# Patient Record
Sex: MALE | Race: WHITE | Employment: FULL TIME | URBAN - METROPOLITAN AREA
[De-identification: names, ages, dates, MRNs, and addresses within clinical notes are randomized per-mention and may not be internally consistent; named-entity substitution may affect disease eponyms.]

---

## 2019-04-18 ENCOUNTER — THERAPY VISIT (OUTPATIENT)
Dept: PHYSICAL THERAPY | Facility: CLINIC | Age: 49
End: 2019-04-18
Payer: COMMERCIAL

## 2019-04-18 DIAGNOSIS — M54.2 NECK PAIN: ICD-10-CM

## 2019-04-18 DIAGNOSIS — Z47.89 AFTERCARE FOLLOWING SURGERY OF THE MUSCULOSKELETAL SYSTEM: ICD-10-CM

## 2019-04-18 PROCEDURE — 97161 PT EVAL LOW COMPLEX 20 MIN: CPT | Mod: GP | Performed by: PHYSICAL THERAPIST

## 2019-04-18 PROCEDURE — 97112 NEUROMUSCULAR REEDUCATION: CPT | Mod: GP | Performed by: PHYSICAL THERAPIST

## 2019-04-18 PROCEDURE — 97110 THERAPEUTIC EXERCISES: CPT | Mod: GP | Performed by: PHYSICAL THERAPIST

## 2019-04-18 NOTE — PROGRESS NOTES
"[facility] Initial Evaluation  Subjective:  The history is provided by the patient. No  was used.   Hermelindo Williamson is a 48 year old male with a cervical spine condition.      This is a new condition  S/P Anterior cervical decompression and fusion on 2/13/2019.  No complications.  Patient has returned to work 4 hours a day with a 10 pound lifting restriction.  Patient reports he is a \"100%\" back to normal.  He feels he does not need therapy, but the surgeon has required therapy.  All symptoms have resolved since surgery.  He perviously was experiencing left upper and lower extremity numbness, tingling, pain, and weakness.    Site of Pain: no pain.      Pain Scale: No pain.  Associated symptoms:  Loss of motion/stiffness.   Symptoms are exacerbated by nothing and relieved by nothing.  Since onset symptoms are rapidly improving (since surgery).        General health as reported by patient is good.  Pertinent medical history includes:  Diabetes, overweight, high blood pressure and smoking.  Medical allergies: no.  Other surgeries include:  Other.  Current medications:  High blood pressure medication and other.  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    Barriers include:  None as reported by the patient.    Red flags:  None as reported by the patient.                        Objective:  Standing Alignment:    Cervical/Thoracic:  Forward head  Shoulder/UE:  Rounded shoulders                                  Cervical/Thoracic Evaluation    AROM:  AROM Cervical:    Flexion:            WNL  Extension:       Min/Mod Loss  Rotation:         Left: Min Loss     Right: Min Loss  Side Bend:      Left: Min Loss     Right:  Min Loss    Strength: Fair/Good deep neck flexor activation  Headaches: none  Cervical Myotomes:  normal                                             "                              General     ROS    Assessment/Plan:    Patient is a 48 year old male with cervical complaints.    Patient has the following significant findings with corresponding treatment plan.                Diagnosis 1:  S/P Cervical decompression   Decreased ROM/flexibility - therapeutic exercise, therapeutic activity and home program  Impaired muscle performance - neuro re-education and home program  Decreased function - therapeutic activities and home program  Impaired posture - neuro re-education, therapeutic activities and home program    Therapy Evaluation Codes:   1) History comprised of:   Personal factors that impact the plan of care:      None.    Comorbidity factors that impact the plan of care are:      Diabetes, High blood pressure and Overweight.     Medications impacting care: High blood pressure.  2) Examination of Body Systems comprised of:   Body structures and functions that impact the plan of care:      Cervical spine.   Activity limitations that impact the plan of care are:      Lifting and Working.  3) Clinical presentation characteristics are:   Stable/Uncomplicated.  4) Decision-Making    Low complexity using standardized patient assessment instrument and/or measureable assessment of functional outcome.  Cumulative Therapy Evaluation is: Low complexity.    Previous and current functional limitations:  (See Goal Flow Sheet for this information)    Short term and Long term goals: (See Goal Flow Sheet for this information)     Communication ability:  Patient appears to be able to clearly communicate and understand verbal and written communication and follow directions correctly.  Treatment Explanation - The following has been discussed with the patient:   RX ordered/plan of care  Anticipated outcomes  Possible risks and side effects  This patient would benefit from PT intervention to resume normal activities.   Rehab potential is good.    Frequency:  1 X week, once  daily  Duration:  for 6 weeks  Discharge Plan:  Achieve all LTG.  Independent in home treatment program.  Reach maximal therapeutic benefit.    Please refer to the daily flowsheet for treatment today, total treatment time and time spent performing 1:1 timed codes.

## 2019-04-18 NOTE — LETTER
"[facility] ATHLETIC [facility]  [address]  [facility] [address]  [phone]    2019    Re: Hermelindo Williamson   :   1970  MRN:  [id number]   REFERRING PHYSICIAN:   Eric Torre    [facility] ATHLETIC [facility]    Date of Initial Evaluation:  19  Visits:  Rxs Used: 1  Reason for Referral:     Neck pain  Aftercare following surgery of the musculoskeletal system    EVALUATION SUMMARY    [facility] Athletic [facility] Initial Evaluation  Subjective:  The history is provided by the patient. No  was used.   Hermelindo Williamson is a 48 year old male with a cervical spine condition.      This is a new condition  S/P Anterior cervical decompression and fusion on 2019.  No complications.  Patient has returned to work 4 hours a day with a 10 pound lifting restriction.  Patient reports he is a \"100%\" back to normal.  He feels he does not need therapy, but the surgeon has required therapy.  All symptoms have resolved since surgery.  He perviously was experiencing left upper and lower extremity numbness, tingling, pain, and weakness.    Site of Pain: no pain.      Pain Scale: No pain.  Associated symptoms:  Loss of motion/stiffness.   Symptoms are exacerbated by nothing and relieved by nothing.  Since onset symptoms are rapidly improving (since surgery).        General health as reported by patient is good.  Pertinent medical history includes:  Diabetes, overweight, high blood pressure and smoking.  Medical allergies: no.  Other surgeries include:  Other.  Current medications:  High blood pressure medication and other.  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  Barriers include:  None as reported by the patient.  Red flags:  None as reported by the patient.  Objective:  Standing Alignment:    Cervical/Thoracic:  Forward " head  Shoulder/UE:  Rounded shoulders  Cervical/Thoracic Evaluation  AROM:  AROM Cervical:  Flexion:            WNL  Extension:       Min/Mod Loss  Rotation:         Left: Min Loss     Right: Min Loss  Side Bend:      Left: Min Loss     Right:  Min Loss  Strength: Fair/Good deep neck flexor activation  Headaches: none  Re: Hermelindo Williamson   :   1970    Cervical Myotomes:  normal  Assessment/Plan:    Patient is a 48 year old male with cervical complaints.    Patient has the following significant findings with corresponding treatment plan.                Diagnosis 1:  S/P Cervical decompression   Decreased ROM/flexibility - therapeutic exercise, therapeutic activity and home program  Impaired muscle performance - neuro re-education and home program  Decreased function - therapeutic activities and home program  Impaired posture - neuro re-education, therapeutic activities and home program  Therapy Evaluation Codes:   1) History comprised of:   Personal factors that impact the plan of care:      None.    Comorbidity factors that impact the plan of care are:      Diabetes, High blood pressure and Overweight.     Medications impacting care: High blood pressure.  2) Examination of Body Systems comprised of:   Body structures and functions that impact the plan of care:      Cervical spine.   Activity limitations that impact the plan of care are:      Lifting and Working.  3) Clinical presentation characteristics are:   Stable/Uncomplicated.  4) Decision-Making    Low complexity using standardized patient assessment instrument and/or measureable assessment of functional outcome.  Cumulative Therapy Evaluation is: Low complexity.  Previous and current functional limitations:  (See Goal Flow Sheet for this information)    Short term and Long term goals: (See Goal Flow Sheet for this information)   Communication ability:  Patient appears to be able to clearly communicate and understand verbal and written communication  and follow directions correctly.  Treatment Explanation - The following has been discussed with the patient:   RX ordered/plan of care  Anticipated outcomes  Possible risks and side effects  This patient would benefit from PT intervention to resume normal activities.   Rehab potential is good.  Frequency:  1 X week, once daily  Duration:  for 6 weeks  Discharge Plan:  Achieve all LTG.  Independent in home treatment program.  Reach maximal therapeutic benefit.    INQUIRIES  Therapist: Donta Campa PT  [facility]  [address] [place] [address]  Phone: [phone]  Fax: [phone]

## 2019-10-31 PROBLEM — Z47.89 AFTERCARE FOLLOWING SURGERY OF THE MUSCULOSKELETAL SYSTEM: Status: RESOLVED | Noted: 2019-04-18 | Resolved: 2019-10-31

## 2019-10-31 PROBLEM — M54.2 NECK PAIN: Status: RESOLVED | Noted: 2019-04-18 | Resolved: 2019-10-31

## 2023-07-31 ENCOUNTER — HOSPITAL ENCOUNTER (OUTPATIENT)
Facility: CLINIC | Age: 53
End: 2023-07-31
Attending: INTERNAL MEDICINE | Admitting: INTERNAL MEDICINE
Payer: COMMERCIAL

## 2023-07-31 ENCOUNTER — OFFICE VISIT (OUTPATIENT)
Dept: FAMILY MEDICINE | Facility: CLINIC | Age: 53
End: 2023-07-31
Payer: COMMERCIAL

## 2023-07-31 ENCOUNTER — TELEPHONE (OUTPATIENT)
Dept: GASTROENTEROLOGY | Facility: CLINIC | Age: 53
End: 2023-07-31

## 2023-07-31 VITALS
WEIGHT: 243 LBS | DIASTOLIC BLOOD PRESSURE: 80 MMHG | HEART RATE: 86 BPM | SYSTOLIC BLOOD PRESSURE: 134 MMHG | OXYGEN SATURATION: 97 % | HEIGHT: 69 IN | BODY MASS INDEX: 35.99 KG/M2 | RESPIRATION RATE: 14 BRPM | TEMPERATURE: 98.6 F

## 2023-07-31 DIAGNOSIS — E66.812 CLASS 2 SEVERE OBESITY DUE TO EXCESS CALORIES WITH SERIOUS COMORBIDITY AND BODY MASS INDEX (BMI) OF 36.0 TO 36.9 IN ADULT (H): ICD-10-CM

## 2023-07-31 DIAGNOSIS — E11.29 TYPE 2 DIABETES MELLITUS WITH MICROALBUMINURIA, WITHOUT LONG-TERM CURRENT USE OF INSULIN (H): Primary | ICD-10-CM

## 2023-07-31 DIAGNOSIS — R80.9 TYPE 2 DIABETES MELLITUS WITH MICROALBUMINURIA, WITHOUT LONG-TERM CURRENT USE OF INSULIN (H): Primary | ICD-10-CM

## 2023-07-31 DIAGNOSIS — E78.49 OTHER HYPERLIPIDEMIA: ICD-10-CM

## 2023-07-31 DIAGNOSIS — E66.01 CLASS 2 SEVERE OBESITY DUE TO EXCESS CALORIES WITH SERIOUS COMORBIDITY AND BODY MASS INDEX (BMI) OF 36.0 TO 36.9 IN ADULT (H): ICD-10-CM

## 2023-07-31 DIAGNOSIS — G57.93 NEUROPATHY OF BOTH FEET: ICD-10-CM

## 2023-07-31 DIAGNOSIS — Z12.11 SCREEN FOR COLON CANCER: ICD-10-CM

## 2023-07-31 PROBLEM — G95.9 CERVICAL MYELOPATHY WITH CERVICAL RADICULOPATHY (H): Status: ACTIVE | Noted: 2023-07-31

## 2023-07-31 PROBLEM — E11.9 TYPE 2 DIABETES MELLITUS (H): Chronic | Status: ACTIVE | Noted: 2018-10-31

## 2023-07-31 PROBLEM — E66.09 OBESITY DUE TO EXCESS CALORIES: Status: RESOLVED | Noted: 2017-06-15 | Resolved: 2023-07-31

## 2023-07-31 PROBLEM — F41.9 ANXIETY AND DEPRESSION: Status: ACTIVE | Noted: 2020-09-09

## 2023-07-31 PROBLEM — Z72.0 TOBACCO ABUSE: Status: ACTIVE | Noted: 2017-06-15

## 2023-07-31 PROBLEM — M54.12 CERVICAL MYELOPATHY WITH CERVICAL RADICULOPATHY (H): Status: ACTIVE | Noted: 2023-07-31

## 2023-07-31 PROBLEM — F19.10 SUBSTANCE ABUSE (H): Status: ACTIVE | Noted: 2018-05-04

## 2023-07-31 PROBLEM — I10 ESSENTIAL HYPERTENSION: Status: ACTIVE | Noted: 2018-10-31

## 2023-07-31 PROBLEM — E66.09 OBESITY DUE TO EXCESS CALORIES: Status: ACTIVE | Noted: 2017-06-15

## 2023-07-31 PROBLEM — F19.10 SUBSTANCE ABUSE (H): Status: RESOLVED | Noted: 2018-05-04 | Resolved: 2023-07-31

## 2023-07-31 PROBLEM — F32.A ANXIETY AND DEPRESSION: Status: ACTIVE | Noted: 2020-09-09

## 2023-07-31 LAB
ALBUMIN SERPL BCG-MCNC: 4.3 G/DL (ref 3.5–5.2)
ALP SERPL-CCNC: 101 U/L (ref 40–129)
ALT SERPL W P-5'-P-CCNC: 37 U/L (ref 0–70)
ANION GAP SERPL CALCULATED.3IONS-SCNC: 10 MMOL/L (ref 7–15)
AST SERPL W P-5'-P-CCNC: 27 U/L (ref 0–45)
BILIRUB SERPL-MCNC: 0.2 MG/DL
BUN SERPL-MCNC: 16.7 MG/DL (ref 6–20)
CALCIUM SERPL-MCNC: 9.7 MG/DL (ref 8.6–10)
CHLORIDE SERPL-SCNC: 100 MMOL/L (ref 98–107)
CHOLEST SERPL-MCNC: 399 MG/DL
CREAT SERPL-MCNC: 0.54 MG/DL (ref 0.67–1.17)
CREAT UR-MCNC: 44.1 MG/DL
DEPRECATED HCO3 PLAS-SCNC: 23 MMOL/L (ref 22–29)
GFR SERPL CREATININE-BSD FRML MDRD: >90 ML/MIN/1.73M2
GLUCOSE SERPL-MCNC: 297 MG/DL (ref 70–99)
HBA1C MFR BLD: 11.7 % (ref 0–5.6)
HDLC SERPL-MCNC: 36 MG/DL
LDLC SERPL CALC-MCNC: ABNORMAL MG/DL
LDLC SERPL DIRECT ASSAY-MCNC: 259 MG/DL
MICROALBUMIN UR-MCNC: 37.5 MG/L
MICROALBUMIN/CREAT UR: 85.03 MG/G CR (ref 0–17)
NONHDLC SERPL-MCNC: 363 MG/DL
POTASSIUM SERPL-SCNC: 4.4 MMOL/L (ref 3.4–5.3)
PROT SERPL-MCNC: 7.3 G/DL (ref 6.4–8.3)
SODIUM SERPL-SCNC: 133 MMOL/L (ref 136–145)
TRIGL SERPL-MCNC: 533 MG/DL

## 2023-07-31 PROCEDURE — 83721 ASSAY OF BLOOD LIPOPROTEIN: CPT | Mod: 59 | Performed by: FAMILY MEDICINE

## 2023-07-31 PROCEDURE — 99203 OFFICE O/P NEW LOW 30 MIN: CPT | Performed by: FAMILY MEDICINE

## 2023-07-31 PROCEDURE — 36415 COLL VENOUS BLD VENIPUNCTURE: CPT | Performed by: FAMILY MEDICINE

## 2023-07-31 PROCEDURE — 99207 PR FOOT EXAM NO CHARGE: CPT | Performed by: FAMILY MEDICINE

## 2023-07-31 PROCEDURE — 82570 ASSAY OF URINE CREATININE: CPT | Performed by: FAMILY MEDICINE

## 2023-07-31 PROCEDURE — 80061 LIPID PANEL: CPT | Performed by: FAMILY MEDICINE

## 2023-07-31 PROCEDURE — 80053 COMPREHEN METABOLIC PANEL: CPT | Performed by: FAMILY MEDICINE

## 2023-07-31 PROCEDURE — 82043 UR ALBUMIN QUANTITATIVE: CPT | Performed by: FAMILY MEDICINE

## 2023-07-31 PROCEDURE — 83036 HEMOGLOBIN GLYCOSYLATED A1C: CPT | Performed by: FAMILY MEDICINE

## 2023-07-31 RX ORDER — METFORMIN HCL 500 MG
1000 TABLET, EXTENDED RELEASE 24 HR ORAL 2 TIMES DAILY WITH MEALS
Qty: 360 TABLET | Refills: 1 | Status: SHIPPED | OUTPATIENT
Start: 2023-07-31 | End: 2024-03-20

## 2023-07-31 RX ORDER — ATORVASTATIN CALCIUM 20 MG/1
20 TABLET, FILM COATED ORAL DAILY
Qty: 90 TABLET | Refills: 3 | Status: SHIPPED | OUTPATIENT
Start: 2023-07-31

## 2023-07-31 RX ORDER — METFORMIN HCL 500 MG
2000 TABLET, EXTENDED RELEASE 24 HR ORAL
COMMUNITY
Start: 2022-03-03 | End: 2023-08-03

## 2023-07-31 RX ORDER — GABAPENTIN 300 MG/1
300 CAPSULE ORAL 3 TIMES DAILY
Qty: 270 CAPSULE | Refills: 0 | Status: SHIPPED | OUTPATIENT
Start: 2023-07-31 | End: 2023-08-22

## 2023-07-31 RX ORDER — LANCETS
EACH MISCELLANEOUS
Qty: 100 EACH | Refills: 6 | Status: SHIPPED | OUTPATIENT
Start: 2023-07-31

## 2023-07-31 ASSESSMENT — ANXIETY QUESTIONNAIRES
4. TROUBLE RELAXING: SEVERAL DAYS
3. WORRYING TOO MUCH ABOUT DIFFERENT THINGS: SEVERAL DAYS
1. FEELING NERVOUS, ANXIOUS, OR ON EDGE: MORE THAN HALF THE DAYS
7. FEELING AFRAID AS IF SOMETHING AWFUL MIGHT HAPPEN: SEVERAL DAYS
6. BECOMING EASILY ANNOYED OR IRRITABLE: MORE THAN HALF THE DAYS
GAD7 TOTAL SCORE: 9
GAD7 TOTAL SCORE: 9
IF YOU CHECKED OFF ANY PROBLEMS ON THIS QUESTIONNAIRE, HOW DIFFICULT HAVE THESE PROBLEMS MADE IT FOR YOU TO DO YOUR WORK, TAKE CARE OF THINGS AT HOME, OR GET ALONG WITH OTHER PEOPLE: VERY DIFFICULT
5. BEING SO RESTLESS THAT IT IS HARD TO SIT STILL: SEVERAL DAYS
2. NOT BEING ABLE TO STOP OR CONTROL WORRYING: SEVERAL DAYS

## 2023-07-31 ASSESSMENT — PATIENT HEALTH QUESTIONNAIRE - PHQ9
SUM OF ALL RESPONSES TO PHQ QUESTIONS 1-9: 12
10. IF YOU CHECKED OFF ANY PROBLEMS, HOW DIFFICULT HAVE THESE PROBLEMS MADE IT FOR YOU TO DO YOUR WORK, TAKE CARE OF THINGS AT HOME, OR GET ALONG WITH OTHER PEOPLE: VERY DIFFICULT
SUM OF ALL RESPONSES TO PHQ QUESTIONS 1-9: 12

## 2023-07-31 ASSESSMENT — PAIN SCALES - GENERAL: PAINLEVEL: NO PAIN (0)

## 2023-07-31 NOTE — PROGRESS NOTES
Assessment & Plan     Type 2 diabetes mellitus with microalbuminuria, without long-term current use of insulin (H)  Rstart metformin, astorvastatin. Recheck labs.  - Comprehensive metabolic panel (BMP + Alb, Alk Phos, ALT, AST, Total. Bili, TP); Future  - Lipid panel reflex to direct LDL Non-fasting; Future  - Hemoglobin A1c; Future  - Albumin Random Urine Quantitative with Creat Ratio; Future  - atorvastatin (LIPITOR) 20 MG tablet; Take 1 tablet (20 mg) by mouth daily (Patient not taking: Reported on 9/6/2023)  - metFORMIN (GLUCOPHAGE XR) 500 MG 24 hr tablet; Take 2 tablets (1,000 mg) by mouth 2 times daily (with meals) (Patient not taking: Reported on 9/6/2023)  - blood glucose monitoring (NO BRAND SPECIFIED) meter device kit; Use to test blood sugar 3 times daily or as directed. Preferred blood glucose meter OR supplies to accompany: Blood Glucose Monitor Brands: per insurance.  - blood glucose (NO BRAND SPECIFIED) test strip; Use to test blood sugar 3 times daily or as directed. To accompany: Blood Glucose Monitor Brands: per insurance.  - thin (NO BRAND SPECIFIED) lancets; Use with lanceting device. To accompany: Blood Glucose Monitor Brands: per insurance.  - AMB Adult Diabetes Educator Referral; Future  - FOOT EXAM  - Comprehensive metabolic panel (BMP + Alb, Alk Phos, ALT, AST, Total. Bili, TP)  - Lipid panel reflex to direct LDL Non-fasting  - Hemoglobin A1c  - Albumin Random Urine Quantitative with Creat Ratio  - LDL cholesterol direct    Neuropathy of both feet  Gabapentin, work on getting blood sugars under control.    Screen for colon cancer  - Colonoscopy Screening  Referral; Future    Class 2 severe obesity due to excess calories with serious comorbidity and body mass index (BMI) of 36.0 to 36.9 in adult (H)  Emphasized importance of balanced low-carb diet and regular exercise.      Other hyperlipidemia  Recheck lipids, continue atorvastatin.               BMI:   Estimated body mass index  "is 36.41 kg/m  as calculated from the following:    Height as of this encounter: 1.74 m (5' 8.5\").    Weight as of this encounter: 110.2 kg (243 lb).     Depression Screening Follow Up        7/31/2023     9:20 AM   PHQ   PHQ-9 Total Score 12   Q9: Thoughts of better off dead/self-harm past 2 weeks Several days   F/U: Thoughts of suicide or self-harm No   F/U: Safety concerns No       DO FAREED [facility] PRIOR DEVORA Casarez is a 53 year old, presenting for the following health issues:    Diabetes        7/31/2023     9:30 AM   Additional Questions   Roomed by SENA MILLAN   Accompanied by SELF       History of Present Illness       Mental Health Follow-up:  Patient presents to follow-up on Depression & Anxiety.Patient's depression since last visit has been:  No change  The patient is not having other symptoms associated with depression.  Patient's anxiety since last visit has been:  Bad  The patient is having other symptoms associated with anxiety.  Any significant life events: financial concerns, housing concerns, grief or loss and health concerns  Patient is feeling anxious or having panic attacks.  Patient has no concerns about alcohol or drug use.    Diabetes:   He presents for follow up of diabetes.  He is checking home blood glucose a few times a month.   He checks blood glucose at bedtime.  Blood glucose is sometimes over 200 and never under 70. He is aware of hypoglycemia symptoms including none.   He is concerned about blood sugar frequently over 200.   He is having numbness in feet, burning in feet, redness, sores, or blisters on feet, excessive thirst and blurry vision.  The patient has had a diabetic eye exam in the last 12 months. Eye exam performed on 7/2022. Location of last eye exam [place] [facility].        Reason for visit:  Get diebetes back on track and leg/foot pain    He eats 0-1 servings of fruits and vegetables daily.He consumes 3 sweetened beverage(s) daily.He " "exercises with enough effort to increase his heart rate 9 or less minutes per day.  He exercises with enough effort to increase his heart rate 4 days per week. He is missing 7 dose(s) of medications per week.       Over the past couple years, getting worse lower extremity pain. Pain comes in waves. Pain is mostly in feet and some up into the legs just above the knee. Also feels numbness in his feet. Feels like his toes are \"plastic\" and numb. Hasn't been checking blood sugars at home but last time he remembers, wasn't ever below 200.       Anxiety/Depression      7/31/2023     9:20 AM   PHQ   PHQ-9 Total Score 12   Q9: Thoughts of better off dead/self-harm past 2 weeks Several days   F/U: Thoughts of suicide or self-harm No   F/U: Safety concerns No         7/31/2023     9:35 AM   NORMA-7 SCORE   Total Score 9 (mild anxiety)   Total Score 9     Foot neuropathy is so bad -- thoughts come into his head to end his life (no plan or intent) but then the thoughts go away. Takes care of his wife (recent open heart surgery) and elderly father.        Review of Systems   Constitutional, HEENT, cardiovascular, pulmonary, gi and gu systems are negative, except as otherwise noted.      Objective    /80   Pulse 86   Temp 98.6  F (37  C) (Tympanic)   Resp 14   Ht 1.74 m (5' 8.5\")   Wt 110.2 kg (243 lb)   SpO2 97%   BMI 36.41 kg/m    Body mass index is 36.41 kg/m .  Physical Exam     GENERAL: healthy, alert and no distress  RESP: lungs clear to auscultation - no rales, rhonchi or wheezes  CV: regular rate and rhythm, normal S1 S2, no S3 or S4, no murmur, click or rub, no peripheral edema and peripheral pulses strong  MS: no gross musculoskeletal defects noted, no edema  Diabetic foot exam: normal DP and PT pulses, no trophic changes or ulcerative lesions, normal sensory exam, and normal monofilament exam, except for findings noted on diabetic foot graphical image.  Numbness at locations 1-3 bilaterally and minimal " sensation at spots 4-6 bilaterally.

## 2023-07-31 NOTE — TELEPHONE ENCOUNTER
"Endoscopy Scheduling Screen    Have you had a positive Covid test in the last 14 days?  No    Are you active on MyChart?   No    What insurance is in the chart?  Other:  [facility]    Ordering/Referring Provider:     JONEL BARBA      (If ordering provider performs procedure, schedule with ordering provider unless otherwise instructed. )    BMI: Estimated body mass index is 36.41 kg/m  as calculated from the following:    Height as of an earlier encounter on 7/31/23: 1.74 m (5' 8.5\").    Weight as of an earlier encounter on 7/31/23: 110.2 kg (243 lb).     Sedation Ordered  moderate sedation.   If patient BMI > 50 do not schedule in ASC.    Are you taking any prescription medications for pain?   No    Are you taking methadone or Suboxone?  No    Do you have a history of malignant hyperthermia or adverse reaction to anesthesia?  No GABAPINTON    (Females) Are you currently pregnant?   No     Have you been diagnosed or told you have pulmonary hypertension?   No    Do you have an LVAD?  No    Have you been told you have moderate to severe sleep apnea?  No    Have you been told you have COPD, asthma, or any other lung disease?  No    Do you have any heart conditions?  No     Have you ever had or are you awaiting a heart or lung transplant?   No    Have you had a stroke or transient ischemic attack (TIA aka \"mini stroke\" in the last 6 months?   No    Have you been diagnosed with or been told you have cirrhosis of the liver?   No    Are you currently on dialysis?   No    Do you need assistance transferring?   No    BMI: Estimated body mass index is 36.41 kg/m  as calculated from the following:    Height as of an earlier encounter on 7/31/23: 1.74 m (5' 8.5\").    Weight as of an earlier encounter on 7/31/23: 110.2 kg (243 lb).     Is patients BMI > 40 and scheduling location [facility]?  No    Do you take the medication Phentermine, Ozempic or Wegovy?  No    Do you take the medication Naltrexone?  No    Do you take blood " thinners?  Yes     Are you taking Effient/Prasugrel?  No, you must contact your prescribing provider for direction on holding or bridging with a different medication.      Prep   Are you currently on dialysis or do you have chronic kidney disease?  No    Do you have a diagnosis of diabetes?  Yes (Golytely Prep)    Do you have a diagnosis of cystic fibrosis (CF)?  No    On a regular basis do you go 3 -5 days between bowel movements?  No    BMI > 40?  No    Preferred Pharmacy:  No Pharmacies Listed    Final Scheduling Details   Colonoscopy prep sent?  Golytely Extended Prep    Procedure scheduled  Colonoscopy    Surgeon:  CHEN      Date of procedure:  10/18/23     Schedule PAC:   No    Location  [facility]    Sedation   Moderate Sedation    Patient Reminders:   You will receive a call from a Nurse to review instructions and health history.  This assessment must be completed prior to your procedure.  Failure to complete the Nurse assessment may result in the procedure being cancelled.      On the day of your procedure, please designate an adult(s) who can drive you home stay with you for the next 24 hours. The medicines used in the exam will make you sleepy. You will not be able to drive.      You cannot take public transportation, ride share services, or non-medical taxi service without a responsible caregiver.  Medical transport services are allowed with the requirement that a responsible caregiver will receive you at your destination.  We require that drivers and caregivers are confirmed prior to your procedure.

## 2023-07-31 NOTE — PATIENT INSTRUCTIONS
Gabapentin    300 mg by mouth once daily at bedtime for days 1-5  300 mg by mouth twice daily in AM and at bedtime for days 6-10  300 mg by mouth three times daily (if tolerating without any sedation or other side effects)

## 2023-08-01 ENCOUNTER — TELEPHONE (OUTPATIENT)
Dept: FAMILY MEDICINE | Facility: CLINIC | Age: 53
End: 2023-08-01

## 2023-08-01 NOTE — TELEPHONE ENCOUNTER
"Diabetes Education Scheduling Outreach #1:    Call to patient to schedule. Patient declined to schedule. Pt stated this is \"just a start up.\" Stated he knows what he needs to do.    Crissy Hinton  [facility]  Diabetes and Nutrition Scheduling    "

## 2023-08-03 DIAGNOSIS — E11.29 TYPE 2 DIABETES MELLITUS WITH MICROALBUMINURIA, WITHOUT LONG-TERM CURRENT USE OF INSULIN (H): Primary | ICD-10-CM

## 2023-08-03 DIAGNOSIS — R80.9 TYPE 2 DIABETES MELLITUS WITH MICROALBUMINURIA, WITHOUT LONG-TERM CURRENT USE OF INSULIN (H): Primary | ICD-10-CM

## 2023-08-03 RX ORDER — LISINOPRIL 2.5 MG/1
2.5 TABLET ORAL DAILY
Qty: 90 TABLET | Refills: 3 | Status: SHIPPED | OUTPATIENT
Start: 2023-08-03

## 2023-08-21 ENCOUNTER — ALLIED HEALTH/NURSE VISIT (OUTPATIENT)
Dept: NURSING | Facility: CLINIC | Age: 53
End: 2023-08-21
Payer: COMMERCIAL

## 2023-08-21 ENCOUNTER — LAB (OUTPATIENT)
Dept: LAB | Facility: CLINIC | Age: 53
End: 2023-08-21
Payer: COMMERCIAL

## 2023-08-21 VITALS
SYSTOLIC BLOOD PRESSURE: 138 MMHG | OXYGEN SATURATION: 95 % | HEART RATE: 105 BPM | RESPIRATION RATE: 18 BRPM | DIASTOLIC BLOOD PRESSURE: 74 MMHG

## 2023-08-21 DIAGNOSIS — E11.29 TYPE 2 DIABETES MELLITUS WITH MICROALBUMINURIA, WITHOUT LONG-TERM CURRENT USE OF INSULIN (H): ICD-10-CM

## 2023-08-21 DIAGNOSIS — I10 ESSENTIAL HYPERTENSION: Primary | ICD-10-CM

## 2023-08-21 DIAGNOSIS — R80.9 TYPE 2 DIABETES MELLITUS WITH MICROALBUMINURIA, WITHOUT LONG-TERM CURRENT USE OF INSULIN (H): ICD-10-CM

## 2023-08-21 DIAGNOSIS — G57.93 NEUROPATHY OF BOTH FEET: ICD-10-CM

## 2023-08-21 LAB
ANION GAP SERPL CALCULATED.3IONS-SCNC: 13 MMOL/L (ref 7–15)
BUN SERPL-MCNC: 14.3 MG/DL (ref 6–20)
CALCIUM SERPL-MCNC: 9.6 MG/DL (ref 8.6–10)
CHLORIDE SERPL-SCNC: 98 MMOL/L (ref 98–107)
CREAT SERPL-MCNC: 0.54 MG/DL (ref 0.67–1.17)
DEPRECATED HCO3 PLAS-SCNC: 23 MMOL/L (ref 22–29)
GFR SERPL CREATININE-BSD FRML MDRD: >90 ML/MIN/1.73M2
GLUCOSE SERPL-MCNC: 302 MG/DL (ref 70–99)
POTASSIUM SERPL-SCNC: 4.4 MMOL/L (ref 3.4–5.3)
SODIUM SERPL-SCNC: 134 MMOL/L (ref 136–145)

## 2023-08-21 PROCEDURE — 99207 PR NO CHARGE NURSE ONLY: CPT

## 2023-08-21 PROCEDURE — 80048 BASIC METABOLIC PNL TOTAL CA: CPT

## 2023-08-21 PROCEDURE — 36415 COLL VENOUS BLD VENIPUNCTURE: CPT

## 2023-08-21 NOTE — PROGRESS NOTES
"  Hermelindo Williamson is being followed for Blood Pressure management.    The patient is requesting a medication refill.     He states the medication helped along with gabapentin for his neuropathy pain. He describes the pain as burning pain in his knees, calves and feet. He also experiences \"random shots of pain all over\". He also gets lower leg cramps in both legs at night.    He had previously been taking cyclobenzaprine 1-3 times a day    He states the gabapentin is working to help him sleep better at night than without.       Future Appointments 8/21/2023 - 2/17/2024        Date Visit Type Length Department Provider     8/21/2023  9:00 AM NURSE ONLY 30 min RV NURSE RV RN VISITS              9/6/2023  8:00 AM OFFICE VISIT 30 min RV FAMILY PRACTICE Freddy Johnson DO    Location Instructions:     [facility] is located at [address] , [address] Free parking is available; access the lot [address], then west onto [address].              11/6/2023  8:15 AM LAB 15 min RV LABORATORY RV LAB    Location Instructions:     The clinic is located at [address] [place] [address].  in [place].&nbsp; We offer free parking in our on-site lot.                   BP Readings from Last 3 Encounters:   08/21/23 138/74   07/31/23 134/80   12/13/12 138/78       Wt Readings from Last 3 Encounters:   07/31/23 110.2 kg (243 lb)   12/13/12 127.5 kg (281 lb)       Is pulse 55 or greater? - Yes    Pulse Readings from Last 3 Encounters:   08/21/23 105   07/31/23 86   12/13/12 88       Current blood pressure medication(s): patient was taking 4000 mg of metformin in the evening- advised to take  2000 mg as directed with breakfast and dinner.   Fasting blood sugar was 283 this morning per patient - advised to call triage of persistent high blood sugars 200/300, symptoms. Advised to keep a food log and identify food/food items that elevated blood sugar. " A    Advised to check blood sugar as directed and when experiencing symptoms.     Current Outpatient Medications   Medication Sig Dispense Refill    atorvastatin (LIPITOR) 20 MG tablet Take 1 tablet (20 mg) by mouth daily 90 tablet 3    blood glucose (NO BRAND SPECIFIED) test strip Use to test blood sugar 3 times daily or as directed. To accompany: Blood Glucose Monitor Brands: per insurance. 200 strip 6    blood glucose monitoring (NO BRAND SPECIFIED) meter device kit Use to test blood sugar 3 times daily or as directed. Preferred blood glucose meter OR supplies to accompany: Blood Glucose Monitor Brands: per insurance. 1 kit 0    gabapentin (NEURONTIN) 300 MG capsule Take 1 capsule (300 mg) by mouth 3 times daily for 90 days 270 capsule 0    lisinopril (ZESTRIL) 2.5 MG tablet Take 1 tablet (2.5 mg) by mouth daily 90 tablet 3    metFORMIN (GLUCOPHAGE XR) 500 MG 24 hr tablet Take 2 tablets (1,000 mg) by mouth 2 times daily (with meals) 360 tablet 1    thin (NO BRAND SPECIFIED) lancets Use with lanceting device. To accompany: Blood Glucose Monitor Brands: per insurance. 100 each 6          1. Follow up instructions include:     Per provider.    SUBJECTIVE:                                                    The patient is taking medication as prescribed and is tolerating well.   Patient is not monitoring Blood Pressure at home.   Last 3 home readings   N/a - encouraged to take BP at home and call if > 130/80 and or symptoms listed below.       Out of the following complicating factors: Cough, Headache, Lightheadedness, Shortness of breath, Fatigue, Nausea, Sexual Dysfunction, New onset of swelling or edema, Weakness and New onset of Chest Pain, the patient reports:  None    OBJECTIVE:                                                      Today's BP completed using cuff size: large on right side arm.      Potassium   Date Value Ref Range Status   07/31/2023 4.4 3.4 - 5.3 mmol/L Final   12/04/2007 4.0 3.4 - 5.3 mmol/L  Final     Creatinine   Date Value Ref Range Status   07/31/2023 0.54 (L) 0.67 - 1.17 mg/dL Final   08/23/2007 1.18 0.80 - 1.50 mg/dL Final     Urea Nitrogen   Date Value Ref Range Status   07/31/2023 16.7 6.0 - 20.0 mg/dL Final   08/23/2007 11 5 - 24 mg/dL Final     GFR Estimate   Date Value Ref Range Status   07/31/2023 >90 >60 mL/min/1.73m2 Final   08/23/2007 74 >60 mL/min/1.7m2 Final         Education:  general discussion/verbal explanation  Ways to help improve BP/HTN:   Medications  Call if your blood pressure is consistently > 130/80 and or experiencing symptoms listed above.  Lose weight  Diet low in fat and rich in fruits, vegetables and low fat dairy products  Reduce salt in diet  Do something active for at least 30 minutes a day on most days of the week  Cut down on alcohol (if you drink more than 2 drinks per day)  Decrease stress (exercise, read, yoga, meditation, time for self, etc.)   Patient was given an opportunity to ask questions.    Patient verbalized understanding of this plan and is agreeable.    Karey Brennan RN

## 2023-08-22 RX ORDER — GABAPENTIN 300 MG/1
300 CAPSULE ORAL 3 TIMES DAILY
Qty: 270 CAPSULE | Refills: 1 | Status: SHIPPED | OUTPATIENT
Start: 2023-08-22 | End: 2023-09-06

## 2023-09-06 ENCOUNTER — OFFICE VISIT (OUTPATIENT)
Dept: FAMILY MEDICINE | Facility: CLINIC | Age: 53
End: 2023-09-06
Payer: COMMERCIAL

## 2023-09-06 VITALS
HEIGHT: 69 IN | RESPIRATION RATE: 16 BRPM | HEART RATE: 107 BPM | SYSTOLIC BLOOD PRESSURE: 134 MMHG | TEMPERATURE: 97.9 F | BODY MASS INDEX: 36.73 KG/M2 | WEIGHT: 248 LBS | OXYGEN SATURATION: 96 % | DIASTOLIC BLOOD PRESSURE: 76 MMHG

## 2023-09-06 DIAGNOSIS — R80.9 TYPE 2 DIABETES MELLITUS WITH MICROALBUMINURIA, WITHOUT LONG-TERM CURRENT USE OF INSULIN (H): Primary | ICD-10-CM

## 2023-09-06 DIAGNOSIS — F41.9 ANXIETY AND DEPRESSION: ICD-10-CM

## 2023-09-06 DIAGNOSIS — E11.29 TYPE 2 DIABETES MELLITUS WITH MICROALBUMINURIA, WITHOUT LONG-TERM CURRENT USE OF INSULIN (H): Primary | ICD-10-CM

## 2023-09-06 DIAGNOSIS — G62.9 NEUROPATHY: ICD-10-CM

## 2023-09-06 DIAGNOSIS — G57.93 NEUROPATHY OF BOTH FEET: ICD-10-CM

## 2023-09-06 DIAGNOSIS — R52 WHOLE BODY PAIN: ICD-10-CM

## 2023-09-06 DIAGNOSIS — F32.A ANXIETY AND DEPRESSION: ICD-10-CM

## 2023-09-06 LAB
ALBUMIN SERPL BCG-MCNC: 4.4 G/DL (ref 3.5–5.2)
ALP SERPL-CCNC: 96 U/L (ref 40–129)
ALT SERPL W P-5'-P-CCNC: 41 U/L (ref 0–70)
ANION GAP SERPL CALCULATED.3IONS-SCNC: 12 MMOL/L (ref 7–15)
AST SERPL W P-5'-P-CCNC: 27 U/L (ref 0–45)
BILIRUB SERPL-MCNC: 0.4 MG/DL
BUN SERPL-MCNC: 14 MG/DL (ref 6–20)
CALCIUM SERPL-MCNC: 9.7 MG/DL (ref 8.6–10)
CHLORIDE SERPL-SCNC: 101 MMOL/L (ref 98–107)
CK SERPL-CCNC: 66 U/L (ref 39–308)
CREAT SERPL-MCNC: 0.52 MG/DL (ref 0.67–1.17)
CRP SERPL-MCNC: <3 MG/L
DEPRECATED HCO3 PLAS-SCNC: 23 MMOL/L (ref 22–29)
EGFRCR SERPLBLD CKD-EPI 2021: >90 ML/MIN/1.73M2
ERYTHROCYTE [DISTWIDTH] IN BLOOD BY AUTOMATED COUNT: 11.9 % (ref 10–15)
ERYTHROCYTE [SEDIMENTATION RATE] IN BLOOD BY WESTERGREN METHOD: 16 MM/HR (ref 0–20)
GLUCOSE SERPL-MCNC: 289 MG/DL (ref 70–99)
HCT VFR BLD AUTO: 44.9 % (ref 40–53)
HGB BLD-MCNC: 15.9 G/DL (ref 13.3–17.7)
MCH RBC QN AUTO: 30.1 PG (ref 26.5–33)
MCHC RBC AUTO-ENTMCNC: 35.4 G/DL (ref 31.5–36.5)
MCV RBC AUTO: 85 FL (ref 78–100)
PLATELET # BLD AUTO: 271 10E3/UL (ref 150–450)
POTASSIUM SERPL-SCNC: 4.4 MMOL/L (ref 3.4–5.3)
PROT SERPL-MCNC: 7.8 G/DL (ref 6.4–8.3)
RBC # BLD AUTO: 5.29 10E6/UL (ref 4.4–5.9)
SODIUM SERPL-SCNC: 136 MMOL/L (ref 136–145)
WBC # BLD AUTO: 5.9 10E3/UL (ref 4–11)

## 2023-09-06 PROCEDURE — 85027 COMPLETE CBC AUTOMATED: CPT | Performed by: FAMILY MEDICINE

## 2023-09-06 PROCEDURE — 86140 C-REACTIVE PROTEIN: CPT | Performed by: FAMILY MEDICINE

## 2023-09-06 PROCEDURE — 96127 BRIEF EMOTIONAL/BEHAV ASSMT: CPT | Performed by: FAMILY MEDICINE

## 2023-09-06 PROCEDURE — 86038 ANTINUCLEAR ANTIBODIES: CPT | Performed by: FAMILY MEDICINE

## 2023-09-06 PROCEDURE — 82550 ASSAY OF CK (CPK): CPT | Performed by: FAMILY MEDICINE

## 2023-09-06 PROCEDURE — 36415 COLL VENOUS BLD VENIPUNCTURE: CPT | Performed by: FAMILY MEDICINE

## 2023-09-06 PROCEDURE — 80053 COMPREHEN METABOLIC PANEL: CPT | Performed by: FAMILY MEDICINE

## 2023-09-06 PROCEDURE — 85652 RBC SED RATE AUTOMATED: CPT | Performed by: FAMILY MEDICINE

## 2023-09-06 PROCEDURE — 99215 OFFICE O/P EST HI 40 MIN: CPT | Performed by: FAMILY MEDICINE

## 2023-09-06 RX ORDER — GABAPENTIN 300 MG/1
600 CAPSULE ORAL 3 TIMES DAILY
Qty: 270 CAPSULE | Refills: 1 | Status: SHIPPED | OUTPATIENT
Start: 2023-09-06

## 2023-09-06 RX ORDER — SERTRALINE HYDROCHLORIDE 25 MG/1
TABLET, FILM COATED ORAL
Qty: 166 TABLET | Refills: 0 | Status: SHIPPED | OUTPATIENT
Start: 2023-09-06 | End: 2023-12-27

## 2023-09-06 RX ORDER — CYCLOBENZAPRINE HCL 10 MG
10 TABLET ORAL 3 TIMES DAILY PRN
Qty: 90 TABLET | Refills: 0 | Status: SHIPPED | OUTPATIENT
Start: 2023-09-06 | End: 2023-12-27

## 2023-09-06 ASSESSMENT — ANXIETY QUESTIONNAIRES
1. FEELING NERVOUS, ANXIOUS, OR ON EDGE: NEARLY EVERY DAY
GAD7 TOTAL SCORE: 21
5. BEING SO RESTLESS THAT IT IS HARD TO SIT STILL: NEARLY EVERY DAY
IF YOU CHECKED OFF ANY PROBLEMS ON THIS QUESTIONNAIRE, HOW DIFFICULT HAVE THESE PROBLEMS MADE IT FOR YOU TO DO YOUR WORK, TAKE CARE OF THINGS AT HOME, OR GET ALONG WITH OTHER PEOPLE: EXTREMELY DIFFICULT
GAD7 TOTAL SCORE: 21
7. FEELING AFRAID AS IF SOMETHING AWFUL MIGHT HAPPEN: NEARLY EVERY DAY
3. WORRYING TOO MUCH ABOUT DIFFERENT THINGS: NEARLY EVERY DAY
2. NOT BEING ABLE TO STOP OR CONTROL WORRYING: NEARLY EVERY DAY
4. TROUBLE RELAXING: NEARLY EVERY DAY
6. BECOMING EASILY ANNOYED OR IRRITABLE: NEARLY EVERY DAY

## 2023-09-06 NOTE — PROGRESS NOTES
Assessment & Plan     Type 2 diabetes mellitus with microalbuminuria, without long-term current use of insulin (H)  Uncontrolled. Discussed that hyperglycemia can be contributing to the pain and neuropathy he is having. Will try treating mood and get him CGM so that he has more motivation to take care of blood sugars and can check more easily. Follow up in 1 month. Consider restarting oral or injectable medication. May need to just start insulin.  - Continuous Blood Gluc  (FREESTYLE JENNIFER 2 READER) DONALDO; Use to read blood sugars as per 's instructions.  - Continuous Blood Gluc Sensor (FREESTYLE JENNIFER 2 SENSOR) MISC; Change every 14 days.  - gabapentin (NEURONTIN) 300 MG capsule; Take 2 capsules (600 mg) by mouth 3 times daily    Neuropathy  Likely related to diabetes. Will refer to neurology. Try increasing gabapentin to 600 mg TID.  - Adult Neurology  Referral; Future    Whole body pain  Check baseline labs to look for inflammatory or autoimmune source of pain. I suspect poor mood/stress is adversely affecting pain as is uncontrolled diabetes.   - Comprehensive metabolic panel (BMP + Alb, Alk Phos, ALT, AST, Total. Bili, TP); Future  - CRP, inflammation; Future  - ESR: Erythrocyte sedimentation rate; Future  - Anti Nuclear Alejandra IgG by IFA with Reflex; Future  - CK total; Future  - CBC with platelets; Future  - cyclobenzaprine (FLEXERIL) 10 MG tablet; Take 1 tablet (10 mg) by mouth 3 times daily as needed for muscle spasms  - Comprehensive metabolic panel (BMP + Alb, Alk Phos, ALT, AST, Total. Bili, TP)  - CRP, inflammation  - ESR: Erythrocyte sedimentation rate  - Anti Nuclear Alejandra IgG by IFA with Reflex  - CK total  - CBC with platelets    Anxiety and depression  Previously was on sertraline and found benefit. Restart today.  - sertraline (ZOLOFT) 25 MG tablet; Take 1 tablet (25 mg) by mouth daily for 14 days, THEN 2 tablets (50 mg) daily for 76 days.    Neuropathy of both feet  -  "gabapentin (NEURONTIN) 300 MG capsule; Take 2 capsules (600 mg) by mouth 3 times daily             BMI:   Estimated body mass index is 37.16 kg/m  as calculated from the following:    Height as of this encounter: 1.74 m (5' 8.5\").    Weight as of this encounter: 112.5 kg (248 lb).     40 minutes spent by me on the date of the encounter doing chart review, patient visit, and documentation        Freddy Johnson DO  [facility] DEVORA Casarez is a 53 year old, presenting for the following health issues:  Recheck Medication        9/6/2023     7:54 AM   Additional Questions   Roomed by Era MELGAR CMA         History of Present Illness       Mental Health Follow-up:  Patient presents to follow-up on Depression & Anxiety.Patient's depression since last visit has been:  Worse  The patient is having other symptoms associated with depression.  Patient's anxiety since last visit has been:  Worse  The patient is having other symptoms associated with anxiety.  Any significant life events: relationship concerns, job concerns, financial concerns, housing concerns, grief or loss and health concerns  Patient is feeling anxious or having panic attacks.  Patient has no concerns about alcohol or drug use.    Diabetes:   He presents for follow up of diabetes.  He is checking home blood glucose a few times a week.   He checks blood glucose before meals.  Blood glucose is sometimes over 200 and never under 70. He is aware of hypoglycemia symptoms including none.   He is concerned about blood sugar frequently over 200.   He is having numbness in feet, burning in feet, redness, sores, or blisters on feet, excessive thirst and blurry vision.  The patient has not had a diabetic eye exam in the last 12 months.          Vascular Disease:  He presents for follow up of vascular disease.    He takes nitroglycerin daily.  He is not taking daily aspirin.    He eats 0-1 servings of fruits and vegetables daily.He " "consumes 3 sweetened beverage(s) daily.He exercises with enough effort to increase his heart rate 9 or less minutes per day.  He exercises with enough effort to increase his heart rate 3 or less days per week. He is missing 4 dose(s) of medications per week.  He is not taking prescribed medications regularly due to side effects and remembering to take.       Patient stopped taking ALL medications about a week ago since is unsure if the side effects he is having is from them.     Pain:  Since last visit, Hermelindo states he has largely been in bed to the GI issues and pain. Frequent diarrhea but no hematochezia or melena. States he has been having random bursts of pain everywhere in his body. Constant nerve pain in legs and feet which is chronic. Denies any erythema, warmth, or swelling to joints but they are all achy. He tried taking gabapentin 300 mg TID which initially helped with sleep but not effective now.     Mood:  Has been very bad. Fleeting thoughts of suicide but no plan. States he doesn't act because he procrastinates on everything. Has stress in life -- hasn't worked in the past couple years due to leg pain. He and his wife live with his elderly father. His wife also recently had open heart surgery but started drinking again. It's hard for him to watch her harm herself like that.    DM: not taking any medications. No motivation to check blood sugars. Only checked a few times. All blood sugars over 250 or 300.         7/31/2023     9:20 AM   PHQ   PHQ-9 Total Score 12   Q9: Thoughts of better off dead/self-harm past 2 weeks Several days   F/U: Thoughts of suicide or self-harm No   F/U: Safety concerns No         Review of Systems   Constitutional, HEENT, cardiovascular, pulmonary, gi and gu systems are negative, except as otherwise noted.      Objective    /76   Pulse 107   Temp 97.9  F (36.6  C) (Tympanic)   Resp 16   Ht 1.74 m (5' 8.5\")   Wt 112.5 kg (248 lb)   SpO2 96%   BMI 37.16 kg/m    Body " mass index is 37.16 kg/m .  Physical Exam   GENERAL: healthy, alert and no distress  PSYCH: mentation appears normal, affect flat, and tearful

## 2023-09-06 NOTE — LETTER
[facility]  [address], [place] [address] [phone]                    September 14, 2023    Hermelindo Williamson  [address]  [facility] [address]      Dear Hermelindo,    Here is a summary of your recent test results:  Here is a summary of your recent test results:   -Normal red blood cell (hgb) levels, normal white blood cell count and normal platelet levels.   -Liver and gallbladder tests (ALT,AST, Alk phos,bilirubin) are normal.   -Kidney function (GFR) is normal.   -Sodium is normal.   -Potassium is normal.   -Calcium is normal.   -Glucose is elevated due to your diabetes.   -CRP/ESR (inflammatory markers) are normal.   - CK (test for muscle damage) is normal.   - MEGHNA (autoimmune test) is normal     For additional lab test information, [email or web address].org is an excellent reference.         Your test results are enclosed.      Please contact me if you have any questions.    In addition, here is a list of due or overdue Health Maintenance reminders.    Health Maintenance Due   Topic Date Due    Yearly Preventive Visit  Never done    Discuss Advance Care Planning  Never done    Eye Exam  Never done    Hepatitis B Vaccine (1 of 3 - 3-dose series) Never done    Pneumococcal Vaccine (1 - PCV) Never done    Colorectal Cancer Screening  Never done    HIV Screening  Never done    Hepatitis C Screening  Never done    Zoster (Shingles) Vaccine (1 of 2) Never done    LUNG CANCER SCREENING  Never done    COVID-19 Vaccine (2 - Moderna series) 09/16/2021    Flu Vaccine (1) 09/01/2023       Please call us at [phone] (or use ShareYourCart) to address the above recommendations.            Thank you very much for trusting [facility].     Ralph regardsFreddy DO           Results for orders placed or performed in visit on 09/06/23   Comprehensive metabolic panel (BMP + Alb, Alk Phos, ALT, AST, Total. Bili, TP)     Status: Abnormal   Result Value Ref  Range    Sodium 136 136 - 145 mmol/L    Potassium 4.4 3.4 - 5.3 mmol/L    Chloride 101 98 - 107 mmol/L    Carbon Dioxide (CO2) 23 22 - 29 mmol/L    Anion Gap 12 7 - 15 mmol/L    Urea Nitrogen 14.0 6.0 - 20.0 mg/dL    Creatinine 0.52 (L) 0.67 - 1.17 mg/dL    Calcium 9.7 8.6 - 10.0 mg/dL    Glucose 289 (H) 70 - 99 mg/dL    Alkaline Phosphatase 96 40 - 129 U/L    AST 27 0 - 45 U/L    ALT 41 0 - 70 U/L    Protein Total 7.8 6.4 - 8.3 g/dL    Albumin 4.4 3.5 - 5.2 g/dL    Bilirubin Total 0.4 <=1.2 mg/dL    GFR Estimate >90 >60 mL/min/1.73m2   CRP, inflammation     Status: Normal   Result Value Ref Range    CRP Inflammation <3.00 <5.00 mg/L   ESR: Erythrocyte sedimentation rate     Status: Normal   Result Value Ref Range    Erythrocyte Sedimentation Rate 16 0 - 20 mm/hr   Anti Nuclear Alejandra IgG by IFA with Reflex     Status: Normal   Result Value Ref Range    MEGHNA interpretation Negative Negative   CK total     Status: Normal   Result Value Ref Range    CK 66 39 - 308 U/L   CBC with platelets     Status: Normal   Result Value Ref Range    WBC Count 5.9 4.0 - 11.0 10e3/uL    RBC Count 5.29 4.40 - 5.90 10e6/uL    Hemoglobin 15.9 13.3 - 17.7 g/dL    Hematocrit 44.9 40.0 - 53.0 %    MCV 85 78 - 100 fL    MCH 30.1 26.5 - 33.0 pg    MCHC 35.4 31.5 - 36.5 g/dL    RDW 11.9 10.0 - 15.0 %    Platelet Count 271 150 - 450 10e3/uL

## 2023-09-07 LAB — ANA SER QL IF: NEGATIVE

## 2023-10-11 ENCOUNTER — TELEPHONE (OUTPATIENT)
Dept: GASTROENTEROLOGY | Facility: CLINIC | Age: 53
End: 2023-10-11

## 2023-10-11 NOTE — TELEPHONE ENCOUNTER
Caller: Hermelindo Williamson    Reason for Reschedule/Cancellation (please be detailed, any staff messages or encounters to note?): Patient request to cancel only stating he does not have time to complete procedure and is unsure when his schedule will open. Patient states he will call back to reschedule when ready      Prior to reschedule please review:  Ordering Provider: Freddy Johnson, DO  Sedation per order: MODERATE  Does patient have any ASC Exclusions, please identify?: NO      Notes on Cancelled Procedure:  Procedure: Lower Endoscopy [Colonoscopy]   Date: 10/18  Location: [facility]; [address] [place] [address]  Surgeon: CHEN      Rescheduled: No       Send In - basket message to Panc - Weston Pool if EUS  procedure is canceled or rescheduled: [ N/A, YES or NO] N/A

## 2023-11-06 ENCOUNTER — LAB (OUTPATIENT)
Dept: LAB | Facility: CLINIC | Age: 53
End: 2023-11-06
Payer: COMMERCIAL

## 2023-11-06 DIAGNOSIS — Z11.4 SCREENING FOR HIV (HUMAN IMMUNODEFICIENCY VIRUS): Primary | ICD-10-CM

## 2023-11-06 DIAGNOSIS — Z11.59 NEED FOR HEPATITIS C SCREENING TEST: ICD-10-CM

## 2023-11-06 DIAGNOSIS — R80.9 TYPE 2 DIABETES MELLITUS WITH MICROALBUMINURIA, WITHOUT LONG-TERM CURRENT USE OF INSULIN (H): ICD-10-CM

## 2023-11-06 DIAGNOSIS — E11.29 TYPE 2 DIABETES MELLITUS WITH MICROALBUMINURIA, WITHOUT LONG-TERM CURRENT USE OF INSULIN (H): ICD-10-CM

## 2023-11-06 LAB
HBA1C MFR BLD: 10.7 % (ref 0–5.6)
HCV AB SERPL QL IA: NONREACTIVE
HIV 1+2 AB+HIV1 P24 AG SERPL QL IA: NONREACTIVE

## 2023-11-06 PROCEDURE — 36415 COLL VENOUS BLD VENIPUNCTURE: CPT

## 2023-11-06 PROCEDURE — 87389 HIV-1 AG W/HIV-1&-2 AB AG IA: CPT

## 2023-11-06 PROCEDURE — 83036 HEMOGLOBIN GLYCOSYLATED A1C: CPT

## 2023-11-06 PROCEDURE — 86803 HEPATITIS C AB TEST: CPT

## 2023-12-27 DIAGNOSIS — R52 WHOLE BODY PAIN: ICD-10-CM

## 2023-12-27 DIAGNOSIS — F32.A ANXIETY AND DEPRESSION: ICD-10-CM

## 2023-12-27 DIAGNOSIS — F41.9 ANXIETY AND DEPRESSION: ICD-10-CM

## 2023-12-27 RX ORDER — CYCLOBENZAPRINE HCL 10 MG
10 TABLET ORAL 3 TIMES DAILY PRN
Qty: 90 TABLET | Refills: 0 | Status: SHIPPED | OUTPATIENT
Start: 2023-12-27 | End: 2024-03-20

## 2024-03-20 DIAGNOSIS — E11.29 TYPE 2 DIABETES MELLITUS WITH MICROALBUMINURIA, WITHOUT LONG-TERM CURRENT USE OF INSULIN (H): ICD-10-CM

## 2024-03-20 DIAGNOSIS — R80.9 TYPE 2 DIABETES MELLITUS WITH MICROALBUMINURIA, WITHOUT LONG-TERM CURRENT USE OF INSULIN (H): ICD-10-CM

## 2024-03-20 DIAGNOSIS — R52 WHOLE BODY PAIN: ICD-10-CM

## 2024-03-20 RX ORDER — CYCLOBENZAPRINE HCL 10 MG
10 TABLET ORAL 3 TIMES DAILY PRN
Qty: 90 TABLET | Refills: 0 | Status: SHIPPED | OUTPATIENT
Start: 2024-03-20 | End: 2024-07-10

## 2024-03-20 RX ORDER — METFORMIN HCL 500 MG
1000 TABLET, EXTENDED RELEASE 24 HR ORAL 2 TIMES DAILY WITH MEALS
Qty: 360 TABLET | Refills: 1 | Status: SHIPPED | OUTPATIENT
Start: 2024-03-20

## 2024-03-23 ENCOUNTER — HEALTH MAINTENANCE LETTER (OUTPATIENT)
Age: 54
End: 2024-03-23

## 2024-07-10 DIAGNOSIS — R52 WHOLE BODY PAIN: ICD-10-CM

## 2024-07-10 RX ORDER — CYCLOBENZAPRINE HCL 10 MG
10 TABLET ORAL 3 TIMES DAILY PRN
Qty: 90 TABLET | Refills: 0 | Status: SHIPPED | OUTPATIENT
Start: 2024-07-10

## 2024-07-31 ENCOUNTER — TELEPHONE (OUTPATIENT)
Dept: FAMILY MEDICINE | Facility: CLINIC | Age: 54
End: 2024-07-31
Payer: COMMERCIAL

## 2024-07-31 NOTE — TELEPHONE ENCOUNTER
Needs of attention regarding:  -Wellness (Physical) Visit     Health Maintenance Topics with due status: Overdue       Topic Date Due    YEARLY PREVENTIVE VISIT Never done    ADVANCE CARE PLANNING Never done    EYE EXAM Never done    Pneumococcal Vaccine: Pediatrics (0 to 5 Years) and At-Risk Patients (6 to 64 Years) Never done    COLORECTAL CANCER SCREENING Never done    HEPATITIS B IMMUNIZATION Never done    ZOSTER IMMUNIZATION Never done    LUNG CANCER SCREENING Never done    COVID-19 Vaccine 09/01/2023    PHQ-2 (once per calendar year) 01/01/2024    A1C 02/06/2024     Health Maintenance Topics with due status: Due On       Topic Date Due    LIPID 07/31/2024    MICROALBUMIN 07/31/2024    DIABETIC FOOT EXAM 07/31/2024    ANNUAL REVIEW OF HM ORDERS 07/31/2024       Communication:  See Letter

## 2024-07-31 NOTE — LETTER
[facility]           [address] [place] [address]  [phone]  July 31, 2024    Hermelindo Williamson  [address]  [facility] [address]    Dear Hermelindo,    This is a reminder that you are due for a Wellness Visit (annual full physical).   So that you get your desired appointment time, please call [phone] to schedule this or you can use [employer] [place] if you have an account. If you have had this visit completed elsewhere, or you believe you received this letter in error, please contact us at [phone].    In addition, here is a list of due or overdue Health Maintenance reminders.    Health Maintenance Due   Topic Date Due    Yearly Preventive Visit  Never done    Discuss Advance Care Planning  Never done    Eye Exam  Never done    Pneumococcal Vaccine (1 of 2 - PCV) Never done    Colorectal Cancer Screening  Never done    Hepatitis B Vaccine (1 of 3 - 19+ 3-dose series) Never done    Zoster (Shingles) Vaccine (1 of 2) Never done    LUNG CANCER SCREENING  Never done    COVID-19 Vaccine (2 - 2023-24 season) 09/01/2023    PHQ-2 (once per calendar year)  01/01/2024    A1C Lab  02/06/2024    Cholesterol Lab  07/31/2024    Kidney Microalbumin Urine Test  07/31/2024    Diabetic Foot Exam  07/31/2024    ANNUAL REVIEW OF HM ORDERS  07/31/2024     Best Gómez,    Freddy Johnson DO

## 2024-08-10 ENCOUNTER — HEALTH MAINTENANCE LETTER (OUTPATIENT)
Age: 54
End: 2024-08-10

## 2024-10-14 DIAGNOSIS — R80.9 TYPE 2 DIABETES MELLITUS WITH MICROALBUMINURIA, WITHOUT LONG-TERM CURRENT USE OF INSULIN (H): ICD-10-CM

## 2024-10-14 DIAGNOSIS — E11.29 TYPE 2 DIABETES MELLITUS WITH MICROALBUMINURIA, WITHOUT LONG-TERM CURRENT USE OF INSULIN (H): ICD-10-CM

## 2024-10-14 RX ORDER — METFORMIN HYDROCHLORIDE 500 MG/1
1000 TABLET, EXTENDED RELEASE ORAL 2 TIMES DAILY WITH MEALS
Qty: 360 TABLET | Refills: 1 | Status: SHIPPED | OUTPATIENT
Start: 2024-10-14

## 2024-10-29 DIAGNOSIS — F41.9 ANXIETY AND DEPRESSION: ICD-10-CM

## 2024-10-29 DIAGNOSIS — F32.A ANXIETY AND DEPRESSION: ICD-10-CM

## 2024-10-29 DIAGNOSIS — G57.93 NEUROPATHY OF BOTH FEET: ICD-10-CM

## 2024-10-29 DIAGNOSIS — R80.9 TYPE 2 DIABETES MELLITUS WITH MICROALBUMINURIA, WITHOUT LONG-TERM CURRENT USE OF INSULIN (H): ICD-10-CM

## 2024-10-29 DIAGNOSIS — E11.29 TYPE 2 DIABETES MELLITUS WITH MICROALBUMINURIA, WITHOUT LONG-TERM CURRENT USE OF INSULIN (H): ICD-10-CM

## 2024-10-30 RX ORDER — LISINOPRIL 2.5 MG/1
2.5 TABLET ORAL DAILY
Qty: 90 TABLET | Refills: 0 | Status: SHIPPED | OUTPATIENT
Start: 2024-10-30

## 2024-10-30 RX ORDER — ATORVASTATIN CALCIUM 20 MG/1
20 TABLET, FILM COATED ORAL DAILY
Qty: 90 TABLET | Refills: 0 | Status: SHIPPED | OUTPATIENT
Start: 2024-10-30

## 2024-10-30 RX ORDER — GABAPENTIN 300 MG/1
600 CAPSULE ORAL 3 TIMES DAILY
Qty: 270 CAPSULE | Refills: 0 | Status: SHIPPED | OUTPATIENT
Start: 2024-10-30

## 2024-12-22 ENCOUNTER — HEALTH MAINTENANCE LETTER (OUTPATIENT)
Age: 54
End: 2024-12-22

## 2025-01-10 ENCOUNTER — TRANSFERRED RECORDS (OUTPATIENT)
Dept: MULTI SPECIALTY CLINIC | Facility: CLINIC | Age: 55
End: 2025-01-10

## 2025-01-10 LAB — RETINOPATHY: NORMAL

## 2025-01-21 DIAGNOSIS — R80.9 TYPE 2 DIABETES MELLITUS WITH MICROALBUMINURIA, WITHOUT LONG-TERM CURRENT USE OF INSULIN (H): ICD-10-CM

## 2025-01-21 DIAGNOSIS — F41.9 ANXIETY AND DEPRESSION: ICD-10-CM

## 2025-01-21 DIAGNOSIS — F32.A ANXIETY AND DEPRESSION: ICD-10-CM

## 2025-01-21 DIAGNOSIS — E11.29 TYPE 2 DIABETES MELLITUS WITH MICROALBUMINURIA, WITHOUT LONG-TERM CURRENT USE OF INSULIN (H): ICD-10-CM

## 2025-01-21 RX ORDER — ATORVASTATIN CALCIUM 20 MG/1
20 TABLET, FILM COATED ORAL DAILY
Qty: 90 TABLET | Refills: 0 | Status: SHIPPED | OUTPATIENT
Start: 2025-01-21

## 2025-01-21 RX ORDER — LISINOPRIL 2.5 MG/1
2.5 TABLET ORAL DAILY
Qty: 90 TABLET | Refills: 0 | Status: SHIPPED | OUTPATIENT
Start: 2025-01-21

## 2025-04-12 ENCOUNTER — HEALTH MAINTENANCE LETTER (OUTPATIENT)
Age: 55
End: 2025-04-12

## 2025-04-14 ENCOUNTER — OFFICE VISIT (OUTPATIENT)
Dept: FAMILY MEDICINE | Facility: CLINIC | Age: 55
End: 2025-04-14
Payer: COMMERCIAL

## 2025-04-14 VITALS
TEMPERATURE: 98.4 F | DIASTOLIC BLOOD PRESSURE: 72 MMHG | BODY MASS INDEX: 34.21 KG/M2 | OXYGEN SATURATION: 97 % | SYSTOLIC BLOOD PRESSURE: 138 MMHG | HEART RATE: 95 BPM | RESPIRATION RATE: 14 BRPM | WEIGHT: 231 LBS | HEIGHT: 69 IN

## 2025-04-14 DIAGNOSIS — E11.29 TYPE 2 DIABETES MELLITUS WITH MICROALBUMINURIA, WITHOUT LONG-TERM CURRENT USE OF INSULIN (H): ICD-10-CM

## 2025-04-14 DIAGNOSIS — R52 WHOLE BODY PAIN: ICD-10-CM

## 2025-04-14 DIAGNOSIS — R80.9 TYPE 2 DIABETES MELLITUS WITH MICROALBUMINURIA, WITHOUT LONG-TERM CURRENT USE OF INSULIN (H): ICD-10-CM

## 2025-04-14 DIAGNOSIS — Z12.11 SCREEN FOR COLON CANCER: ICD-10-CM

## 2025-04-14 DIAGNOSIS — G57.93 NEUROPATHY OF BOTH FEET: ICD-10-CM

## 2025-04-14 DIAGNOSIS — Z91.89 AT RISK FOR DIABETIC FOOT ULCER: ICD-10-CM

## 2025-04-14 DIAGNOSIS — E11.29 TYPE 2 DIABETES MELLITUS WITH MICROALBUMINURIA, WITHOUT LONG-TERM CURRENT USE OF INSULIN (H): Primary | ICD-10-CM

## 2025-04-14 DIAGNOSIS — Z00.00 ROUTINE GENERAL MEDICAL EXAMINATION AT A HEALTH CARE FACILITY: Primary | ICD-10-CM

## 2025-04-14 DIAGNOSIS — R80.9 TYPE 2 DIABETES MELLITUS WITH MICROALBUMINURIA, WITHOUT LONG-TERM CURRENT USE OF INSULIN (H): Primary | ICD-10-CM

## 2025-04-14 LAB
EST. AVERAGE GLUCOSE BLD GHB EST-MCNC: 298 MG/DL
HBA1C MFR BLD: 12 % (ref 0–5.6)

## 2025-04-14 PROCEDURE — 99207 PR FOOT EXAM NO CHARGE: CPT | Performed by: FAMILY MEDICINE

## 2025-04-14 PROCEDURE — 83036 HEMOGLOBIN GLYCOSYLATED A1C: CPT | Performed by: FAMILY MEDICINE

## 2025-04-14 PROCEDURE — 3075F SYST BP GE 130 - 139MM HG: CPT | Performed by: FAMILY MEDICINE

## 2025-04-14 PROCEDURE — 1126F AMNT PAIN NOTED NONE PRSNT: CPT | Performed by: FAMILY MEDICINE

## 2025-04-14 PROCEDURE — 84443 ASSAY THYROID STIM HORMONE: CPT | Performed by: FAMILY MEDICINE

## 2025-04-14 PROCEDURE — 82043 UR ALBUMIN QUANTITATIVE: CPT | Performed by: FAMILY MEDICINE

## 2025-04-14 PROCEDURE — 36415 COLL VENOUS BLD VENIPUNCTURE: CPT | Performed by: FAMILY MEDICINE

## 2025-04-14 PROCEDURE — 3078F DIAST BP <80 MM HG: CPT | Performed by: FAMILY MEDICINE

## 2025-04-14 PROCEDURE — 82607 VITAMIN B-12: CPT | Performed by: FAMILY MEDICINE

## 2025-04-14 PROCEDURE — 80061 LIPID PANEL: CPT | Performed by: FAMILY MEDICINE

## 2025-04-14 PROCEDURE — G2211 COMPLEX E/M VISIT ADD ON: HCPCS | Performed by: FAMILY MEDICINE

## 2025-04-14 PROCEDURE — 80053 COMPREHEN METABOLIC PANEL: CPT | Performed by: FAMILY MEDICINE

## 2025-04-14 PROCEDURE — 99214 OFFICE O/P EST MOD 30 MIN: CPT | Mod: 25 | Performed by: FAMILY MEDICINE

## 2025-04-14 PROCEDURE — 82570 ASSAY OF URINE CREATININE: CPT | Performed by: FAMILY MEDICINE

## 2025-04-14 PROCEDURE — 99396 PREV VISIT EST AGE 40-64: CPT | Performed by: FAMILY MEDICINE

## 2025-04-14 RX ORDER — METFORMIN HYDROCHLORIDE 500 MG/1
1000 TABLET, EXTENDED RELEASE ORAL 2 TIMES DAILY WITH MEALS
Qty: 360 TABLET | Refills: 1 | Status: SHIPPED | OUTPATIENT
Start: 2025-04-14

## 2025-04-14 RX ORDER — GABAPENTIN 300 MG/1
600 CAPSULE ORAL 3 TIMES DAILY
Qty: 540 CAPSULE | Refills: 1 | Status: SHIPPED | OUTPATIENT
Start: 2025-04-14

## 2025-04-14 RX ORDER — LISINOPRIL 2.5 MG/1
2.5 TABLET ORAL DAILY
Qty: 90 TABLET | Refills: 1 | Status: SHIPPED | OUTPATIENT
Start: 2025-04-14

## 2025-04-14 RX ORDER — CYCLOBENZAPRINE HCL 10 MG
10 TABLET ORAL 3 TIMES DAILY PRN
Qty: 90 TABLET | Refills: 0 | Status: SHIPPED | OUTPATIENT
Start: 2025-04-14

## 2025-04-14 RX ORDER — ATORVASTATIN CALCIUM 20 MG/1
20 TABLET, FILM COATED ORAL DAILY
Qty: 90 TABLET | Refills: 3 | Status: SHIPPED | OUTPATIENT
Start: 2025-04-14 | End: 2025-04-15

## 2025-04-14 RX ORDER — ARIPIPRAZOLE 5 MG/1
TABLET ORAL
COMMUNITY
Start: 2025-04-08

## 2025-04-14 RX ORDER — ATORVASTATIN CALCIUM 20 MG/1
20 TABLET, FILM COATED ORAL DAILY
Qty: 90 TABLET | Refills: 0 | Status: SHIPPED | OUTPATIENT
Start: 2025-04-14 | End: 2025-04-14

## 2025-04-14 RX ORDER — SERTRALINE HYDROCHLORIDE 100 MG/1
100 TABLET, FILM COATED ORAL DAILY
COMMUNITY
Start: 2025-04-08

## 2025-04-14 SDOH — HEALTH STABILITY: PHYSICAL HEALTH: ON AVERAGE, HOW MANY DAYS PER WEEK DO YOU ENGAGE IN MODERATE TO STRENUOUS EXERCISE (LIKE A BRISK WALK)?: 0 DAYS

## 2025-04-14 SDOH — HEALTH STABILITY: PHYSICAL HEALTH: ON AVERAGE, HOW MANY MINUTES DO YOU ENGAGE IN EXERCISE AT THIS LEVEL?: 0 MIN

## 2025-04-14 ASSESSMENT — PATIENT HEALTH QUESTIONNAIRE - PHQ9
10. IF YOU CHECKED OFF ANY PROBLEMS, HOW DIFFICULT HAVE THESE PROBLEMS MADE IT FOR YOU TO DO YOUR WORK, TAKE CARE OF THINGS AT HOME, OR GET ALONG WITH OTHER PEOPLE: VERY DIFFICULT
SUM OF ALL RESPONSES TO PHQ QUESTIONS 1-9: 9
SUM OF ALL RESPONSES TO PHQ QUESTIONS 1-9: 9

## 2025-04-14 ASSESSMENT — SOCIAL DETERMINANTS OF HEALTH (SDOH): HOW OFTEN DO YOU GET TOGETHER WITH FRIENDS OR RELATIVES?: TWICE A WEEK

## 2025-04-14 ASSESSMENT — PAIN SCALES - GENERAL: PAINLEVEL_OUTOF10: NO PAIN (0)

## 2025-04-14 NOTE — PATIENT INSTRUCTIONS
Patient Education   Preventive Care Advice   This is general advice given by our system to help you stay healthy. However, your care team may have specific advice just for you. Please talk to your care team about your preventive care needs.  Nutrition  Eat 5 or more servings of fruits and vegetables each day.  Try wheat bread, brown rice and whole grain pasta (instead of white bread, rice, and pasta).  Get enough calcium and vitamin D. Check the label on foods and aim for 100% of the RDA (recommended daily allowance).  Lifestyle  Exercise at least 150 minutes each week  (30 minutes a day, 5 days a week).  Do muscle strengthening activities 2 days a week. These help control your weight and prevent disease.  No smoking.  Wear sunscreen to prevent skin cancer.  Have a dental exam and cleaning every 6 months.  Yearly exams  See your health care team every year to talk about:  Any changes in your health.  Any medicines your care team has prescribed.  Preventive care, family planning, and ways to prevent chronic diseases.  Shots (vaccines)   HPV shots (up to age 26), if you've never had them before.  Hepatitis B shots (up to age 59), if you've never had them before.  COVID-19 shot: Get this shot when it's due.  Flu shot: Get a flu shot every year.  Tetanus shot: Get a tetanus shot every 10 years.  Pneumococcal, hepatitis A, and RSV shots: Ask your care team if you need these based on your risk.  Shingles shot (for age 50 and up)  General health tests  Diabetes screening:  Starting at age 35, Get screened for diabetes at least every 3 years.  If you are younger than age 35, ask your care team if you should be screened for diabetes.  Cholesterol test: At age 39, start having a cholesterol test every 5 years, or more often if advised.  Bone density scan (DEXA): At age 50, ask your care team if you should have this scan for osteoporosis (brittle bones).  Hepatitis C: Get tested at least once in your life.  STIs (sexually  transmitted infections)  Before age 24: Ask your care team if you should be screened for STIs.  After age 24: Get screened for STIs if you're at risk. You are at risk for STIs (including HIV) if:  You are sexually active with more than one person.  You don't use condoms every time.  You or a partner was diagnosed with a sexually transmitted infection.  If you are at risk for HIV, ask about PrEP medicine to prevent HIV.  Get tested for HIV at least once in your life, whether you are at risk for HIV or not.  Cancer screening tests  Cervical cancer screening: If you have a cervix, begin getting regular cervical cancer screening tests starting at age 21.  Breast cancer scan (mammogram): If you've ever had breasts, begin having regular mammograms starting at age 40. This is a scan to check for breast cancer.  Colon cancer screening: It is important to start screening for colon cancer at age 45.  Have a colonoscopy test every 10 years (or more often if you're at risk) Or, ask your provider about stool tests like a FIT test every year or Cologuard test every 3 years.  To learn more about your testing options, visit:   .  For help making a decision, visit:   https://bit.ly/ei87053.  Prostate cancer screening test: If you have a prostate, ask your care team if a prostate cancer screening test (PSA) at age 55 is right for you.  Lung cancer screening: If you are a current or former smoker ages 50 to 80, ask your care team if ongoing lung cancer screenings are right for you.  For informational purposes only. Not to replace the advice of your health care provider. Copyright   2023 [facility]. All rights reserved. Clinically reviewed by the [facility]itions Program. Volt 231759 - REV 01/24.  Preventing Falls: Care Instructions  Injuries and health problems such as trouble walking or poor eyesight can increase your risk of falling. So can some medicines. But there are things you can do to help  "prevent falls. You can exercise to get stronger. You can also arrange your home to make it safer.    Talk to your doctor about the medicines you take. Ask if any of them increase the risk of falls and whether they can be changed or stopped.   Try to exercise regularly. It can help improve your strength and balance. This can help lower your risk of falling.         Practice fall safety and prevention.   Wear low-heeled shoes that fit well and give your feet good support. Talk to your doctor if you have foot problems that make this hard.  Carry a cellphone or wear a medical alert device that you can use to call for help.  Use stepladders instead of chairs to reach high objects. Don't climb if you're at risk for falls. Ask for help, if needed.  Wear the correct eyeglasses, if you need them.        Make your home safer.   Remove rugs, cords, clutter, and furniture from walkways.  Keep your house well lit. Use night-lights in hallways and bathrooms.  Install and use sturdy handrails on stairways.  Wear nonskid footwear, even inside. Don't walk barefoot or in socks without shoes.        Be safe outside.   Use handrails, curb cuts, and ramps whenever possible.  Keep your hands free by using a shoulder bag or backpack.  Try to walk in well-lit areas. Watch out for uneven ground, changes in pavement, and debris.  Be careful in the winter. Walk on the grass or gravel when sidewalks are slippery. Use de-icer on steps and walkways. Add non-slip devices to shoes.    Put grab bars and nonskid mats in your shower or tub and near the toilet. Try to use a shower chair or bath bench when bathing.   Get into a tub or shower by putting in your weaker leg first. Get out with your strong side first. Have a phone or medical alert device in the bathroom with you.   Where can you learn more?  Go to https://www.[employer].net/patiented  Enter G117 in the search box to learn more about \"Preventing Falls: Care Instructions.\"  Current as of: " July 31, 2024  Content Version: 14.4    2490-9282 [employer].   Care instructions adapted under license by your healthcare professional. If you have questions about a medical condition or this instruction, always ask your healthcare professional. [employer] disclaims any warranty or liability for your use of this information.        Try over the counter alpha-lipoic-acid --- 600 mg daily.

## 2025-04-14 NOTE — PROGRESS NOTES
Preventive Care Visit  [facility] [place]  Freddy Johnson DO, Family Medicine  Apr 14, 2025      Assessment & Plan     Routine general medical examination at a health care facility  Health maintenance reviewed and updated. Emphasized importance of balanced diet and regular exercise.      Neuropathy of both feet  Ongoing and suspect this is due to poorly controlled diabetes mellitus. Discussed that I don't think neuropathy will improve unless we get his blood sugars under control.   - Orthopedic  Referral; Future  - Orthotics, Mastectomy and Custom Compression Orders Type: Orthotic; Orthotic Type Requested: Diabetic Shoe(s)/Insert(s); X-Depth Shoe(s): Yes; Insert(s) Quantity: 3 pair; Req Documentation: Progress note must support the need for shoes/orthotics. ...    Type 2 diabetes mellitus with microalbuminuria, without long-term current use of insulin (H)  Recheck A1c today. Historically has been poorly controlled. Likely needs 2nd or even 3rd agent to control blood sugars. Continue checking BG -- goal 2-3x per day. Follow up in 1 month to review blood sugars.   - Comprehensive metabolic panel (BMP + Alb, Alk Phos, ALT, AST, Total. Bili, TP); Future  - Lipid panel reflex to direct LDL Fasting; Future  - Albumin Random Urine Quantitative with Creat Ratio; Future  - Hemoglobin A1c; Future  - Vitamin B12; Future  - TSH with free T4 reflex; Future  - metFORMIN (GLUCOPHAGE XR) 500 MG 24 hr tablet; Take 2 tablets (1,000 mg) by mouth 2 times daily (with meals).  - atorvastatin (LIPITOR) 20 MG tablet; Take 1 tablet (20 mg) by mouth daily.  - lisinopril (ZESTRIL) 2.5 MG tablet; Take 1 tablet (2.5 mg) by mouth daily.  - FOOT EXAM  - Orthopedic  Referral; Future  - Orthotics, Mastectomy and Custom Compression Orders Type: Orthotic; Orthotic Type Requested: Diabetic Shoe(s)/Insert(s); X-Depth Shoe(s): Yes; Insert(s) Quantity: 3 pair; Req Documentation: Progress note must support the need  "for shoes/orthotics. ...    At risk for diabetic foot ulcer  Preulcerative lesion of left 2nd toe -- has been there for over a year. Previously saw podiatry and had it debrided. Given significant neuropathy, recommend follow up with podiatry. Will also place order for diabetic shoes/inserts to help  - Orthopedic  Referral; Future  - Orthotics, Mastectomy and Custom Compression Orders Type: Orthotic; Orthotic Type Requested: Diabetic Shoe(s)/Insert(s); X-Depth Shoe(s): Yes; Insert(s) Quantity: 3 pair; Req Documentation: Progress note must support the need for shoes/orthotics. ...      Screen for colon cancer  - Colonoscopy Screening  Referral; Future    Patient has been advised of split billing requirements and indicates understanding: Yes        BMI  Estimated body mass index is 34.61 kg/m  as calculated from the following:    Height as of this encounter: 1.74 m (5' 8.5\").    Weight as of this encounter: 104.8 kg (231 lb).     Counseling  Appropriate preventive services were addressed with this patient via screening, questionnaire, or discussion as appropriate for fall prevention, nutrition, physical activity, Tobacco-use cessation, social engagement, weight loss and cognition.  Checklist reviewing preventive services available has been given to the patient.  Reviewed patient's diet, addressing concerns and/or questions.   The patient was instructed to see the dentist every 6 months.   He is at risk for psychosocial distress and has been provided with information to reduce risk.   The patient's PHQ-9 score is consistent with mild depression. He was provided with information regarding depression.       Joss Casarez is a 54 year old, presenting for the following:  Physical        4/14/2025    12:45 PM   Additional Questions   Roomed by SENA Cheema CMA   Accompanied by SELF     Via the Health Maintenance questionnaire, the patient has reported the following services have been completed -Eye Exam: " dont remember 2025-01-10, this information has been sent to the abstraction team.    HPI      Diabetes Follow-up    How often are you checking your blood sugar? Not at all  What concerns do you have today about your diabetes? None   Do you have any of these symptoms? (Select all that apply)  Numbness in feet, Burning in feet, and Redness, sores, or blisters on feet    Has an appointment with neurology on 4/25/25 for neuropathy - in [place]. He is taking gabapentin 600 mg TID.     He is only checking blood sugars a couple times per week. Tries to check postprandial -- 120s. He will see spikes over 200    Hyperlipidemia Follow-Up    Are you regularly taking any medication or supplement to lower your cholesterol?   Yes- Lipitor 20 mg   Are you having muscle aches or other side effects that you think could be caused by your cholesterol lowering medication?  No    Hypertension Follow-up    Do you check your blood pressure regularly outside of the clinic? No   Are you following a low salt diet? No  Are your blood pressures ever more than 140 on the top number (systolic) OR more   than 90 on the bottom number (diastolic), for example 140/90? N/A    BP Readings from Last 2 Encounters:   04/14/25 138/72   09/06/23 134/76     Hemoglobin A1C (%)   Date Value   11/06/2023 10.7 (H)   07/31/2023 11.7 (H)     LDL Cholesterol Calculated (no units)   Date Value   07/31/2023      Comment:     Cannot estimate LDL when triglyceride exceeds 400 mg/dL     LDL Cholesterol Direct (mg/dL)   Date Value   07/31/2023 259 (H)         Depression and Anxiety - pt states therapist is handling Rx's         7/31/2023     9:35 AM 9/6/2023     7:53 AM   NORMA-7 SCORE   Total Score 9 (mild anxiety) 21 (severe anxiety)   Total Score 9 21           7/31/2023     9:20 AM 4/14/2025    12:44 PM   PHQ   PHQ-9 Total Score 12 9    Q9: Thoughts of better off dead/self-harm past 2 weeks Several days  Not at all   F/U: Thoughts of suicide or self-harm No     F/U:  Safety concerns No         Patient-reported    Proxy-reported           Social History     Tobacco Use    Smoking status: Former     Passive exposure: Past    Smokeless tobacco: Never   Vaping Use    Vaping status: Never Used   Substance Use Topics    Alcohol use: Not Currently    Drug use: Not Currently         7/31/2023     9:20 AM 4/14/2025    12:44 PM   PHQ   PHQ-9 Total Score 12 9    Q9: Thoughts of better off dead/self-harm past 2 weeks Several days  Not at all   F/U: Thoughts of suicide or self-harm No     F/U: Safety concerns No         Patient-reported    Proxy-reported         7/31/2023     9:35 AM 9/6/2023     7:53 AM   NORMA-7 SCORE   Total Score 9 (mild anxiety) 21 (severe anxiety)   Total Score 9 21       Advance Care Planning  Patient does not have a Health Care Directive      4/14/2025   General Health   How would you rate your overall physical health? (!) POOR   Feel stress (tense, anxious, or unable to sleep) Rather much   (!) STRESS CONCERN      4/14/2025   Nutrition   Three or more servings of calcium each day? (!) I DON'T KNOW   Diet: Diabetic   How many servings of fruit and vegetables per day? (!) 0-1   How many sweetened beverages each day? (!) 2         4/14/2025   Exercise   Days per week of moderate/strenous exercise 0 days   Average minutes spent exercising at this level 0 min         4/14/2025   Social Factors   Frequency of gathering with friends or relatives Twice a week   Worry food won't last until get money to buy more Yes   Food not last or not have enough money for food? Yes   Do you have housing? (Housing is defined as stable permanent housing and does not include staying ouside in a car, in a tent, in an abandoned building, in an overnight shelter, or couch-surfing.) Yes   Are you worried about losing your housing? No   Lack of transportation? No   Unable to get utilities (heat,electricity)? No   (!) FOOD SECURITY CONCERN PRESENT      4/14/2025   Fall Risk   Fallen 2 or more times  "in the past year? Yes   Trouble with walking or balance? Yes   Gait Speed Test (Document in seconds) 4.9   Gait Speed Test Interpretation Less than or equal to 5.00 seconds - PASS         4/14/2025   Dental   Dentist two times every year? (!) NO         Today's PHQ-9 Score:       4/14/2025    12:44 PM   PHQ-9 SCORE   PHQ-9 Total Score MyChart 9 (Mild depression)   PHQ-9 Total Score 9        Patient-reported         4/14/2025   Substance Use   Alcohol more than 3/day or more than 7/wk No   Do you use any other substances recreationally? (!) CANNABIS PRODUCTS     Social History     Tobacco Use    Smoking status: Former     Passive exposure: Past    Smokeless tobacco: Never   Vaping Use    Vaping status: Never Used   Substance Use Topics    Alcohol use: Not Currently    Drug use: Not Currently           4/14/2025   STI Screening   New sexual partner(s) since last STI/HIV test? No   ASCVD Risk   The ASCVD Risk score (Roselyn WHEAT, et al., 2019) failed to calculate for the following reasons:    The valid total cholesterol range is 130 to 320 mg/dL        Reviewed and updated as needed this visit by Provider                    Past Medical History:   Diagnosis Date    Type 2 diabetes mellitus with diabetic polyneuropathy (H)      Past Surgical History:   Procedure Laterality Date    APPENDECTOMY      APPENDECTOMY      SURGICAL HISTORY OF -   01/01/2007    sigmoid colectomy after diverticulitis         Review of Systems  Constitutional, HEENT, cardiovascular, pulmonary, gi and gu systems are negative, except as otherwise noted.     Objective    Exam  /72   Pulse 95   Temp 98.4  F (36.9  C) (Tympanic)   Resp 14   Ht 1.74 m (5' 8.5\")   Wt 104.8 kg (231 lb)   SpO2 97%   BMI 34.61 kg/m     Estimated body mass index is 34.61 kg/m  as calculated from the following:    Height as of this encounter: 1.74 m (5' 8.5\").    Weight as of this encounter: 104.8 kg (231 lb).    Physical Exam  GENERAL: alert and no " distress  EYES: Eyes grossly normal to inspection  HENT: ear canals and TM's normal, nose and mouth without ulcers or lesions  NECK: no adenopathy, no asymmetry, masses, or scars  RESP: lungs clear to auscultation - no rales, rhonchi or wheezes  CV: regular rate and rhythm, normal S1 S2, no S3 or S4, no murmur, click or rub, no peripheral edema  SKIN: no suspicious lesions or rashes  Diabetic foot exam: normal DP and PT pulses, normal sensory exam, normal monofilament exam, and hyperkeratosis of left 2nd toe with purplish discoloration underneath       The longitudinal plan of care for the diagnosis(es)/condition(s) as documented were addressed during this visit. Due to the added complexity in care, I will continue to support Hermelindo in the subsequent management and with ongoing continuity of care.    Signed Electronically by: Freddy Johnson DO

## 2025-04-15 DIAGNOSIS — E11.29 TYPE 2 DIABETES MELLITUS WITH MICROALBUMINURIA, WITHOUT LONG-TERM CURRENT USE OF INSULIN (H): ICD-10-CM

## 2025-04-15 DIAGNOSIS — R80.9 TYPE 2 DIABETES MELLITUS WITH MICROALBUMINURIA, WITHOUT LONG-TERM CURRENT USE OF INSULIN (H): ICD-10-CM

## 2025-04-15 LAB
ALBUMIN SERPL BCG-MCNC: 4.5 G/DL (ref 3.5–5.2)
ALP SERPL-CCNC: 96 U/L (ref 40–150)
ALT SERPL W P-5'-P-CCNC: 36 U/L (ref 0–70)
ANION GAP SERPL CALCULATED.3IONS-SCNC: 11 MMOL/L (ref 7–15)
AST SERPL W P-5'-P-CCNC: 28 U/L (ref 0–45)
BILIRUB SERPL-MCNC: 0.4 MG/DL
BUN SERPL-MCNC: 15.2 MG/DL (ref 6–20)
CALCIUM SERPL-MCNC: 9.9 MG/DL (ref 8.8–10.4)
CHLORIDE SERPL-SCNC: 99 MMOL/L (ref 98–107)
CHOLEST SERPL-MCNC: 278 MG/DL
CREAT SERPL-MCNC: 0.63 MG/DL (ref 0.67–1.17)
CREAT UR-MCNC: 68.4 MG/DL
EGFRCR SERPLBLD CKD-EPI 2021: >90 ML/MIN/1.73M2
FASTING STATUS PATIENT QL REPORTED: YES
FASTING STATUS PATIENT QL REPORTED: YES
GLUCOSE SERPL-MCNC: 301 MG/DL (ref 70–99)
HCO3 SERPL-SCNC: 26 MMOL/L (ref 22–29)
HDLC SERPL-MCNC: 51 MG/DL
LDLC SERPL CALC-MCNC: 184 MG/DL
MICROALBUMIN UR-MCNC: 145 MG/L
MICROALBUMIN/CREAT UR: 211.99 MG/G CR (ref 0–17)
NONHDLC SERPL-MCNC: 227 MG/DL
POTASSIUM SERPL-SCNC: 4.6 MMOL/L (ref 3.4–5.3)
PROT SERPL-MCNC: 7.7 G/DL (ref 6.4–8.3)
SODIUM SERPL-SCNC: 136 MMOL/L (ref 135–145)
TRIGL SERPL-MCNC: 213 MG/DL
TSH SERPL DL<=0.005 MIU/L-ACNC: 1.92 UIU/ML (ref 0.3–4.2)
VIT B12 SERPL-MCNC: 1392 PG/ML (ref 232–1245)

## 2025-04-15 RX ORDER — ATORVASTATIN CALCIUM 40 MG/1
40 TABLET, FILM COATED ORAL DAILY
Qty: 90 TABLET | Refills: 3 | Status: SHIPPED | OUTPATIENT
Start: 2025-04-15

## 2025-04-21 ENCOUNTER — OFFICE VISIT (OUTPATIENT)
Dept: PODIATRY | Facility: CLINIC | Age: 55
End: 2025-04-21
Attending: FAMILY MEDICINE
Payer: COMMERCIAL

## 2025-04-21 ENCOUNTER — ANCILLARY PROCEDURE (OUTPATIENT)
Dept: GENERAL RADIOLOGY | Facility: CLINIC | Age: 55
End: 2025-04-21
Attending: PODIATRIST
Payer: COMMERCIAL

## 2025-04-21 VITALS — WEIGHT: 231 LBS | BODY MASS INDEX: 35.01 KG/M2 | HEIGHT: 68 IN

## 2025-04-21 DIAGNOSIS — M20.42 HAMMER TOE OF SECOND TOE OF LEFT FOOT: ICD-10-CM

## 2025-04-21 DIAGNOSIS — E11.43 TYPE II DIABETES MELLITUS WITH PERIPHERAL AUTONOMIC NEUROPATHY (H): ICD-10-CM

## 2025-04-21 DIAGNOSIS — R80.9 TYPE 2 DIABETES MELLITUS WITH MICROALBUMINURIA, WITHOUT LONG-TERM CURRENT USE OF INSULIN (H): ICD-10-CM

## 2025-04-21 DIAGNOSIS — Z91.89 AT RISK FOR DIABETIC FOOT ULCER: ICD-10-CM

## 2025-04-21 DIAGNOSIS — L97.522 SKIN ULCER OF SECOND TOE OF LEFT FOOT WITH FAT LAYER EXPOSED (H): Primary | ICD-10-CM

## 2025-04-21 DIAGNOSIS — E11.65 UNCONTROLLED TYPE 2 DIABETES MELLITUS WITH HYPERGLYCEMIA (H): ICD-10-CM

## 2025-04-21 DIAGNOSIS — E11.29 TYPE 2 DIABETES MELLITUS WITH MICROALBUMINURIA, WITHOUT LONG-TERM CURRENT USE OF INSULIN (H): ICD-10-CM

## 2025-04-21 DIAGNOSIS — L84 PRE-ULCERATIVE CALLUSES: ICD-10-CM

## 2025-04-21 DIAGNOSIS — L97.522 SKIN ULCER OF SECOND TOE OF LEFT FOOT WITH FAT LAYER EXPOSED (H): ICD-10-CM

## 2025-04-21 DIAGNOSIS — G57.93 NEUROPATHY OF BOTH FEET: ICD-10-CM

## 2025-04-21 PROCEDURE — 99203 OFFICE O/P NEW LOW 30 MIN: CPT | Mod: 25 | Performed by: PODIATRIST

## 2025-04-21 PROCEDURE — 97597 DBRDMT OPN WND 1ST 20 CM/<: CPT | Mod: LT | Performed by: PODIATRIST

## 2025-04-21 PROCEDURE — 73630 X-RAY EXAM OF FOOT: CPT | Mod: TC | Performed by: RADIOLOGY

## 2025-04-21 NOTE — LETTER
4/21/2025      Hermelindo Williamson  [address] [facility] [id number]      Dear Colleague,    Thank you for referring your patient, Hermelindo Williamson, to the [facility]. Please see a copy of my visit note below.    PATIENT HISTORY:  Dr. Johnson requested I see this patient for their foot issue.    Hermelindo Williamson is a 54 year old male who presents to clinic for diabetic foot exam and chronic pain with growth to the left 2nd toe. He had DM type II with painful peripheral neuropathy. He has been seen by his PCP who prescribed diabetic shoes and orthotics on 4/14/25. He has a pre-ulcerative lesion on his left 2nd toe, no openings or signs of infection per patient such as drainage redness. He has had pain to his left 2nd toe for a couple of years with a progressing growth. The notes the tip of the toe to be enlarged with a black discoloration. He has not tried any treatments.     Review of Systems:  Patient denies fever, chills, rash, wound, stiffness, limping, numbness, weakness, heart burn, blood in stool, chest pain with activity, calf pain when walking, shortness of breath with activity, chronic cough, easy bleeding/bruising, swelling of ankles, excessive thirst, fatigue, depression, anxiety.  Patient admits to numbness feet, pain left 2nd toe.     PAST MEDICAL HISTORY:   Past Medical History:   Diagnosis Date     Type 2 diabetes mellitus with diabetic polyneuropathy (H)         PAST SURGICAL HISTORY:   Past Surgical History:   Procedure Laterality Date     APPENDECTOMY       APPENDECTOMY       SURGICAL HISTORY OF -   01/01/2007    sigmoid colectomy after diverticulitis        MEDICATIONS:   Current Outpatient Medications:      ARIPiprazole (ABILIFY) 5 MG tablet, , Disp: , Rfl:      atorvastatin (LIPITOR) 40 MG tablet, Take 1 tablet (40 mg) by mouth daily., Disp: 90 tablet, Rfl: 3     blood glucose (NO BRAND SPECIFIED) test strip, Use to test blood sugar 3 times daily or as directed.  To accompany: Blood Glucose Monitor Brands: per insurance., Disp: 200 strip, Rfl: 6     blood glucose monitoring (NO BRAND SPECIFIED) meter device kit, Use to test blood sugar 3 times daily or as directed. Preferred blood glucose meter OR supplies to accompany: Blood Glucose Monitor Brands: per insurance., Disp: 1 kit, Rfl: 0     Continuous Blood Gluc  (FREESTYLE JENNIFER 2 READER) DONALDO, USE TO READ BLOOD SUGARS AS PER 'S INSTRUCTIONS., Disp: 1 each, Rfl: 0     Continuous Blood Gluc Sensor (FREESTYLE JENNIFER 2 SENSOR) [facility], Change every 14 days., Disp: 2 each, Rfl: 5     cyclobenzaprine (FLEXERIL) 10 MG tablet, Take 1 tablet (10 mg) by mouth 3 times daily as needed for muscle spasms., Disp: 90 tablet, Rfl: 0     gabapentin (NEURONTIN) 300 MG capsule, Take 2 capsules (600 mg) by mouth 3 times daily., Disp: 540 capsule, Rfl: 1     insulin pen needle (31G X 5 MM) 31G X 5 MM miscellaneous, Use 1 pen needles weekly, Disp: 100 each, Rfl: 0     lisinopril (ZESTRIL) 2.5 MG tablet, Take 1 tablet (2.5 mg) by mouth daily., Disp: 90 tablet, Rfl: 1     metFORMIN (GLUCOPHAGE XR) 500 MG 24 hr tablet, Take 2 tablets (1,000 mg) by mouth 2 times daily (with meals)., Disp: 360 tablet, Rfl: 1     semaglutide (OZEMPIC) 2 MG/3ML pen, Inject 0.25 mg subcutaneously every 7 days., Disp: 3 mL, Rfl: 0     sertraline (ZOLOFT) 100 MG tablet, Take 100 mg by mouth daily., Disp: , Rfl:      thin (NO BRAND SPECIFIED) lancets, Use with lanceting device. To accompany: Blood Glucose Monitor Brands: per insurance., Disp: 100 each, Rfl: 6     ALLERGIES:  No Known Allergies     SOCIAL HISTORY:   Social History     Socioeconomic History     Marital status:      Spouse name: Not on file     Number of children: Not on file     Years of education: Not on file     Highest education level: Not on file   Occupational History     Not on file   Tobacco Use     Smoking status: Former     Passive exposure: Past     Smokeless tobacco: Never    Vaping Use     Vaping status: Never Used   Substance and Sexual Activity     Alcohol use: Not Currently     Drug use: Not Currently     Sexual activity: Not on file   Other Topics Concern     Parent/sibling w/ CABG, MI or angioplasty before 65F 55M? Not Asked   Social History Narrative     Not on file     Social Drivers of Health     Financial Resource Strain: Low Risk  (4/14/2025)    Financial Resource Strain      Within the past 12 months, have you or your family members you live with been unable to get utilities (heat, electricity) when it was really needed?: No   Food Insecurity: High Risk (4/14/2025)    Food Insecurity      Within the past 12 months, did you worry that your food would run out before you got money to buy more?: Yes      Within the past 12 months, did the food you bought just not last and you didn t have money to get more?: Yes   Transportation Needs: Low Risk  (4/14/2025)    Transportation Needs      Within the past 12 months, has lack of transportation kept you from medical appointments, getting your medicines, non-medical meetings or appointments, work, or from getting things that you need?: No   Physical Activity: Inactive (4/14/2025)    Exercise Vital Sign      Days of Exercise per Week: 0 days      Minutes of Exercise per Session: 0 min   Stress: Stress Concern Present (4/14/2025)    [place] [employer] - Occupational Stress Questionnaire      Feeling of Stress : Rather much   Social Connections: Unknown (4/14/2025)    Social Connection and Isolation Panel [NHANES]      Frequency of Communication with Friends and Family: Not on file      Frequency of Social Gatherings with Friends and Family: Twice a week      Attends Restoration Services: Not on file      Active Member of Clubs or Organizations: Not on file      Attends Club or Organization Meetings: Not on file      Marital Status: Not on file   Interpersonal Safety: Not on file   Housing Stability: Low Risk   (4/14/2025)    Housing Stability      Do you have housing? : Yes      Are you worried about losing your housing?: No        FAMILY HISTORY:   Family History   Problem Relation Age of Onset     Diabetes Mother         EXAM:Vitals: There were no vitals taken for this visit.  BMI= There is no height or weight on file to calculate BMI.    A1C:   Lab Results   Component Value Date    A1C 12.0 04/14/2025    A1C 10.7 11/06/2023    A1C 11.7 07/31/2023       General appearance: Patient is alert and fully cooperative with history & exam.  No sign of distress is noted during the visit.     Psychiatric: Affect is pleasant & appropriate.  Patient appears motivated to improve health.     Respiratory: Breathing is regular & unlabored while sitting.     HEENT: Hearing is intact to spoken word.  Speech is clear.  No gross evidence of visual impairment that would impact ambulation.    Lower Extremity Focused:    Dermatologic: Left 2nd toe distal dorsal including the toenail extensive hyperkeratotic tissue with subdermal hematoma, upon debridement no noted purulence. Ulceration measured post debridement 1cm in diameter x 2 mm depth red healthy bleeding, granular base, no probe to bone. Toenails x 10 thickened discolored, brittle, subungual debris.      Vascular: DP pulse 1/4 right 1/4 left PT pulse 2/4 right 2/4 left.  No significant edema or varicosities noted.  CFT and skin temperature is normal to both lower extremities.     Neurologic: Lower extremity sensation is diminished bilateral to light touch.      Musculoskeletal: toes 2-5 bilateral minimal to no plantarflexed at the IPJs, pain with palpation to the left 2nd toe distal phalanx,         Radiographs:  I personally reviewed the xrays. Left foot 3 views to rule out osteomyelitis 2nd toe noting per my read no osteomyelitis or lytic lesions to the distal phalanx 2nd toe.      ASSESSMENT:   DM type II with peripheral neuropathy uncontrolled HgA1c 12%  Neuropathic ulcer left 2nd  toe      Medical Decision Making/Plan:  Review[facility] in [facility].  X-rays left foot ordered and reviewed today noting no signs of osteomyelitis per my read  Start daily iodine bandages left 2nd toe.   No need for oral antibiotics.   He is to get his diabetic shoe and orthotics scheduled with orthotist.   Follow-up in 2 weeks for assessment of ulceration. If infection such as pus, redness, swelling occur go to the ED or call the office right away. You are at risk of loss of toe if infection occurs.    Procedure: Verbal consent for ulcer debridement provided by patient in office.  Using a #15 blade debridement of the left second toe down to the level of the subcutaneous layer was performed.  Prior to debridement no opening was noted extensive hyperkeratotic tissue to the distal dorsal tip of the left second toe.  Postdebridement measurements were 1 cm x 1 cm x 2 mm depth probing through to the subcutaneous layer.  No Probe to bone.  No purulence was noted.  Application of an iodine gauze.      All questions were answered to patients satisfaction and they will call with further questions or concerns.       Carmella Sweet DPM      Again, thank you for allowing me to participate in the care of your patient.        Sincerely,        Carmella Sweet DPM    Electronically signed

## 2025-04-21 NOTE — PATIENT INSTRUCTIONS
Thank you for choosing [facility] / Foot & Ankle Surgery!    DR SUERO CLINIC:  [facility] [place]   [address]   [place] [address]   (Mon, Tues)     [facility]  [address], [place] [address]  (Friday)    [facility]  [address]  [place] [address]  (Wednesdays)       TRIAGE LINE: [phone]  APPOINTMENTS: [phone]  RADIOLOGY: [phone]  SET UP SURGERY: [phone]  PHYSICAL THERAPY: [phone]   BILLING QUESTIONS: [phone]  FAX: [phone]       Follow up: ***      ***

## 2025-04-21 NOTE — PROGRESS NOTES
PATIENT HISTORY:  Dr. Johnson requested I see this patient for their foot issue.    Hermelindo Williamson is a 54 year old male who presents to clinic for diabetic foot exam and chronic pain with growth to the left 2nd toe. He had DM type II with painful peripheral neuropathy. He has been seen by his PCP who prescribed diabetic shoes and orthotics on 4/14/25. He has a pre-ulcerative lesion on his left 2nd toe, no openings or signs of infection per patient such as drainage redness. He has had pain to his left 2nd toe for a couple of years with a progressing growth. The notes the tip of the toe to be enlarged with a black discoloration. He has not tried any treatments.     Review of Systems:  Patient denies fever, chills, rash, wound, stiffness, limping, numbness, weakness, heart burn, blood in stool, chest pain with activity, calf pain when walking, shortness of breath with activity, chronic cough, easy bleeding/bruising, swelling of ankles, excessive thirst, fatigue, depression, anxiety.  Patient admits to numbness feet, pain left 2nd toe.     PAST MEDICAL HISTORY:   Past Medical History:   Diagnosis Date    Type 2 diabetes mellitus with diabetic polyneuropathy (H)         PAST SURGICAL HISTORY:   Past Surgical History:   Procedure Laterality Date    APPENDECTOMY      APPENDECTOMY      SURGICAL HISTORY OF -   01/01/2007    sigmoid colectomy after diverticulitis        MEDICATIONS:   Current Outpatient Medications:     ARIPiprazole (ABILIFY) 5 MG tablet, , Disp: , Rfl:     atorvastatin (LIPITOR) 40 MG tablet, Take 1 tablet (40 mg) by mouth daily., Disp: 90 tablet, Rfl: 3    blood glucose (NO BRAND SPECIFIED) test strip, Use to test blood sugar 3 times daily or as directed. To accompany: Blood Glucose Monitor Brands: per insurance., Disp: 200 strip, Rfl: 6    blood glucose monitoring (NO BRAND SPECIFIED) meter device kit, Use to test blood sugar 3 times daily or as directed. Preferred blood glucose meter OR supplies to  accompany: Blood Glucose Monitor Brands: per insurance., Disp: 1 kit, Rfl: 0    Continuous Blood Gluc  (FREESTYLE JENNIFER 2 READER) DONALDO, USE TO READ BLOOD SUGARS AS PER 'S INSTRUCTIONS., Disp: 1 each, Rfl: 0    Continuous Blood Gluc Sensor (FREESTYLE JENNIFER 2 SENSOR) [facility], Change every 14 days., Disp: 2 each, Rfl: 5    cyclobenzaprine (FLEXERIL) 10 MG tablet, Take 1 tablet (10 mg) by mouth 3 times daily as needed for muscle spasms., Disp: 90 tablet, Rfl: 0    gabapentin (NEURONTIN) 300 MG capsule, Take 2 capsules (600 mg) by mouth 3 times daily., Disp: 540 capsule, Rfl: 1    insulin pen needle (31G X 5 MM) 31G X 5 MM miscellaneous, Use 1 pen needles weekly, Disp: 100 each, Rfl: 0    lisinopril (ZESTRIL) 2.5 MG tablet, Take 1 tablet (2.5 mg) by mouth daily., Disp: 90 tablet, Rfl: 1    metFORMIN (GLUCOPHAGE XR) 500 MG 24 hr tablet, Take 2 tablets (1,000 mg) by mouth 2 times daily (with meals)., Disp: 360 tablet, Rfl: 1    semaglutide (OZEMPIC) 2 MG/3ML pen, Inject 0.25 mg subcutaneously every 7 days., Disp: 3 mL, Rfl: 0    sertraline (ZOLOFT) 100 MG tablet, Take 100 mg by mouth daily., Disp: , Rfl:     thin (NO BRAND SPECIFIED) lancets, Use with lanceting device. To accompany: Blood Glucose Monitor Brands: per insurance., Disp: 100 each, Rfl: 6     ALLERGIES:  No Known Allergies     SOCIAL HISTORY:   Social History     Socioeconomic History    Marital status:      Spouse name: Not on file    Number of children: Not on file    Years of education: Not on file    Highest education level: Not on file   Occupational History    Not on file   Tobacco Use    Smoking status: Former     Passive exposure: Past    Smokeless tobacco: Never   Vaping Use    Vaping status: Never Used   Substance and Sexual Activity    Alcohol use: Not Currently    Drug use: Not Currently    Sexual activity: Not on file   Other Topics Concern    Parent/sibling w/ CABG, MI or angioplasty before 65F 55M? Not Asked   Social  History Narrative    Not on file     Social Drivers of Health     Financial Resource Strain: Low Risk  (4/14/2025)    Financial Resource Strain     Within the past 12 months, have you or your family members you live with been unable to get utilities (heat, electricity) when it was really needed?: No   Food Insecurity: High Risk (4/14/2025)    Food Insecurity     Within the past 12 months, did you worry that your food would run out before you got money to buy more?: Yes     Within the past 12 months, did the food you bought just not last and you didn t have money to get more?: Yes   Transportation Needs: Low Risk  (4/14/2025)    Transportation Needs     Within the past 12 months, has lack of transportation kept you from medical appointments, getting your medicines, non-medical meetings or appointments, work, or from getting things that you need?: No   Physical Activity: Inactive (4/14/2025)    Exercise Vital Sign     Days of Exercise per Week: 0 days     Minutes of Exercise per Session: 0 min   Stress: Stress Concern Present (4/14/2025)    [employer] [place] [employer] - Occupational Stress Questionnaire     Feeling of Stress : Rather much   Social Connections: Unknown (4/14/2025)    Social Connection and Isolation Panel [NHANES]     Frequency of Communication with Friends and Family: Not on file     Frequency of Social Gatherings with Friends and Family: Twice a week     Attends [employer] Services: Not on file     Active Member of Clubs or Organizations: Not on file     Attends Club or Organization Meetings: Not on file     Marital Status: Not on file   Interpersonal Safety: Not on file   Housing Stability: Low Risk  (4/14/2025)    Housing Stability     Do you have housing? : Yes     Are you worried about losing your housing?: No        FAMILY HISTORY:   Family History   Problem Relation Age of Onset    Diabetes Mother         EXAM:Vitals: There were no vitals taken for this visit.  BMI= There is no  height or weight on file to calculate BMI.    A1C:   Lab Results   Component Value Date    A1C 12.0 04/14/2025    A1C 10.7 11/06/2023    A1C 11.7 07/31/2023       General appearance: Patient is alert and fully cooperative with history & exam.  No sign of distress is noted during the visit.     Psychiatric: Affect is pleasant & appropriate.  Patient appears motivated to improve health.     Respiratory: Breathing is regular & unlabored while sitting.     HEENT: Hearing is intact to spoken word.  Speech is clear.  No gross evidence of visual impairment that would impact ambulation.    Lower Extremity Focused:    Dermatologic: Left 2nd toe distal dorsal including the toenail extensive hyperkeratotic tissue with subdermal hematoma, upon debridement no noted purulence. Ulceration measured post debridement 1cm in diameter x 2 mm depth red healthy bleeding, granular base, no probe to bone. Toenails x 10 thickened discolored, brittle, subungual debris.      Vascular: DP pulse 1/4 right 1/4 left PT pulse 2/4 right 2/4 left.  No significant edema or varicosities noted.  CFT and skin temperature is normal to both lower extremities.     Neurologic: Lower extremity sensation is diminished bilateral to light touch.      Musculoskeletal: toes 2-5 bilateral minimal to no plantarflexed at the IPJs, pain with palpation to the left 2nd toe distal phalanx,         Radiographs:  I personally reviewed the xrays. Left foot 3 views to rule out osteomyelitis 2nd toe noting per my read no osteomyelitis or lytic lesions to the distal phalanx 2nd toe.      ASSESSMENT:   DM type II with peripheral neuropathy uncontrolled HgA1c 12%  Neuropathic ulcer left 2nd toe      Medical Decision Making/Plan:  Reviewed patient's chart in [facility].  X-rays left foot ordered and reviewed today noting no signs of osteomyelitis per my read  Start daily iodine bandages left 2nd toe.   No need for oral antibiotics.   He is to get his diabetic shoe and orthotics  scheduled with orthotist.   Follow-up in 2 weeks for assessment of ulceration. If infection such as pus, redness, swelling occur go to the ED or call the office right away. You are at risk of loss of toe if infection occurs.    Procedure: Verbal consent for ulcer debridement provided by patient in office.  Using a #15 blade debridement of the left second toe down to the level of the subcutaneous layer was performed.  Prior to debridement no opening was noted extensive hyperkeratotic tissue to the distal dorsal tip of the left second toe.  Postdebridement measurements were 1 cm x 1 cm x 2 mm depth probing through to the subcutaneous layer.  No Probe to bone.  No purulence was noted.  Application of an iodine gauze.      All questions were answered to patients satisfaction and they will call with further questions or concerns.       Carmella Sweet DPM

## 2025-04-25 ENCOUNTER — TELEPHONE (OUTPATIENT)
Dept: FAMILY MEDICINE | Facility: CLINIC | Age: 55
End: 2025-04-25
Payer: COMMERCIAL

## 2025-04-25 DIAGNOSIS — G57.93 NEUROPATHY OF BOTH FEET: Primary | ICD-10-CM

## 2025-04-25 NOTE — TELEPHONE ENCOUNTER
Reason for call:  Other   Patient called regarding (reason for call): referral  Additional comments: patient is asking if you can renew his neurology referral for his neuropathy    Phone number to reach patient:  [phone]     Best Time:  any    Can we leave a detailed message on this number?  YES    Travel screening: Not Applicable

## 2025-04-28 NOTE — TELEPHONE ENCOUNTER
Cld pt and LM to call (provided #) the referral to set up appt if they wanted to jump start the scheduling otherwise they would call within 2 days.

## 2025-04-28 NOTE — TELEPHONE ENCOUNTER
LOV 4/14/25    Neurology referral placed 9/6/23    Would you like the patient to use an e-visit or are you willing to place order without?    Routing to provider to review and advise.     Kinsey Reyna RN   [facility]

## 2025-04-29 ENCOUNTER — PATIENT OUTREACH (OUTPATIENT)
Dept: CARE COORDINATION | Facility: CLINIC | Age: 55
End: 2025-04-29
Payer: COMMERCIAL

## 2025-04-29 NOTE — TELEPHONE ENCOUNTER
Called # [phone] and spoke to patient     Advised patient of Dr. Johnson's note    Patient stated understanding and had no further questions.    Advised patient to call [phone] and ask to speak to a triage nurse with any further questions or concerns.     Kinsey Reyna RN on 4/29/2025 at 4:18 PM   [facility]

## 2025-05-01 ENCOUNTER — PATIENT OUTREACH (OUTPATIENT)
Dept: CARE COORDINATION | Facility: CLINIC | Age: 55
End: 2025-05-01
Payer: COMMERCIAL

## 2025-05-05 ENCOUNTER — OFFICE VISIT (OUTPATIENT)
Dept: PODIATRY | Facility: CLINIC | Age: 55
End: 2025-05-05
Payer: COMMERCIAL

## 2025-05-05 DIAGNOSIS — L84 PRE-ULCERATIVE CALLUSES: ICD-10-CM

## 2025-05-05 DIAGNOSIS — E11.43 TYPE II DIABETES MELLITUS WITH PERIPHERAL AUTONOMIC NEUROPATHY (H): Primary | ICD-10-CM

## 2025-05-05 PROCEDURE — G2211 COMPLEX E/M VISIT ADD ON: HCPCS | Performed by: PODIATRIST

## 2025-05-05 PROCEDURE — 99213 OFFICE O/P EST LOW 20 MIN: CPT | Performed by: PODIATRIST

## 2025-05-05 NOTE — PROGRESS NOTES
Subjective:  Hermelindo Williamson is a 54 year old male who is following up in clinic for diabetic foot exam and chronic pain with Ulcer left 2nd toe. He had DM type II with painful peripheral neuropathy. He has been seen by his PCP who prescribed diabetic shoes and orthotics on 4/14/25.  He has had pain to his left 2nd toe for a couple of years with a progressing growth. He was instructed on betadine bandage changes daily which he has stopped due to no drainage from the toe. He is wearing a regular shoes today, but will get his Diabetic shoes and insoles in about 1 week. No new issues noted today.     EXAM:Vitals: There were no vitals taken for this visit.  BMI= There is no height or weight on file to calculate BMI.    A1C:   Lab Results   Component Value Date    A1C 12.0 04/14/2025    A1C 10.7 11/06/2023    A1C 11.7 07/31/2023     Lower Extremity Focused:    Dermatologic: Left 2nd toe distal dorsal slight hyperkeratotic tissue, no openings, no erythema, no edema, no pain with palpation, no fluctuance, new epithelial tissue. Distal tip of left 3rd toe hyperkeratotic lesion. Toenails x 10 thickened discolored, brittle, subungual debris.      Vascular: DP pulse 1/4 right 1/4 left PT pulse 2/4 right 2/4 left.  No significant edema or varicosities noted.  CFT and skin temperature is normal to both lower extremities.     Neurologic: Lower extremity sensation is diminished bilateral to light touch.      Musculoskeletal: toes 2-5 bilateral minimal to no plantarflexed at the IPJs, pain with palpation to the left 2nd toe distal phalanx,     Radiographs:  I personally reviewed the xrays.    4/21/25 Left foot 3 views to rule out osteomyelitis 2nd toe noting per my read no osteomyelitis or lytic lesions to the distal phalanx 2nd toe.      ASSESSMENT:   DM type II with peripheral neuropathy uncontrolled HgA1c 12%  Neuropathic ulcer left 2nd toe resolving     Medical Decision Making/Plan:  Reviewed patient[facility] chart in [facility].  No need to  bandage at this time. The ulcer has resolved.   No need for oral antibiotics.   He has been measured for his new Diabetic shoes and orthotics.   Follow-up in 4 weeks of wearing your new shoes to make sure there are no new pressure lesions.  Check your feet daily for signs of infection such as redness, openings, pain, drainage, swelling. Monitor for any new callus's to your feet. Wear shoes at all times. Keep your blood sugars under 120mg/dL.     All questions were answered to patients satisfaction and they will call with further questions or concerns.       Carmella Sweet DPM

## 2025-05-05 NOTE — LETTER
5/5/2025      Hermelindo Williamson  [address] [address]      Dear Colleague,    Thank you for referring your patient, Hermelindo Williamson, to the [facility] POD[facility]. Please see a copy of my visit note below.    Subjective:  Hermelindo Williamson is a 54 year old male who is following up in clinic for diabetic foot exam and chronic pain with Ulcer left 2nd toe. He had DM type II with painful peripheral neuropathy. He has been seen by his PCP who prescribed diabetic shoes and orthotics on 4/14/25.  He has had pain to his left 2nd toe for a couple of years with a progressing growth. He was instructed on betadine bandage changes daily which he has stopped due to no drainage from the toe. He is wearing a regular shoes today, but will get his Diabetic shoes and insoles in about 1 week. No new issues noted today.     EXAM:Vitals: There were no vitals taken for this visit.  BMI= There is no height or weight on file to calculate BMI.    A1C:   Lab Results   Component Value Date    A1C 12.0 04/14/2025    A1C 10.7 11/06/2023    A1C 11.7 07/31/2023     Lower Extremity Focused:    Dermatologic: Left 2nd toe distal dorsal slight hyperkeratotic tissue, no openings, no erythema, no edema, no pain with palpation, no fluctuance, new epithelial tissue. Distal tip of left 3rd toe hyperkeratotic lesion. Toenails x 10 thickened discolored, brittle, subungual debris.      Vascular: DP pulse 1/4 right 1/4 left PT pulse 2/4 right 2/4 left.  No significant edema or varicosities noted.  CFT and skin temperature is normal to both lower extremities.     Neurologic: Lower extremity sensation is diminished bilateral to light touch.      Musculoskeletal: toes 2-5 bilateral minimal to no plantarflexed at the IPJs, pain with palpation to the left 2nd toe distal phalanx,     Radiographs:  I personally reviewed the xrays.    4/21/25 Left foot 3 views to rule out osteomyelitis 2nd toe noting per my read no osteomyelitis or  lytic lesions to the distal phalanx 2nd toe.      ASSESSMENT:   DM type II with peripheral neuropathy uncontrolled HgA1c 12%  Neuropathic ulcer left 2nd toe resolving     Medical Decision Making/Plan:  Reviewed patient's chart in epic.  No need to bandage at this time. The ulcer has resolved.   No need for oral antibiotics.   He has been measured for his new Diabetic shoes and orthotics.   Follow-up in 4 weeks of wearing your new shoes to make sure there are no new pressure lesions.  Check your feet daily for signs of infection such as redness, openings, pain, drainage, swelling. Monitor for any new callus's to your feet. Wear shoes at all times. Keep your blood sugars under 120mg/dL.     All questions were answered to patients satisfaction and they will call with further questions or concerns.       Carmella Sweet DPM      Again, thank you for allowing me to participate in the care of your patient.        Sincerely,        Carmella Sweet DPM    Electronically signed

## 2025-05-05 NOTE — PATIENT INSTRUCTIONS
Thank you for choosing [facility] / Foot [facility]le Surgery!    DR SUERO CLINIC:  [facility]   [address]   [place] [address]   (Mon, Tues)     [facility]  [address], [place] [address]  (Friday)    [facility]  [address]  [place] [address]  (Wednesdays)       TRIAGE LINE: [phone]  APPOINTMENTS: [phone]  RADIOLOGY: [phone]  SET UP SURGERY: [phone]  PHYSICAL THERAPY: [phone]   BILLING QUESTIONS: [phone]  FAX: [phone]       Follow up: 4 weeks of wearing your new Diabetic shoes.       No need to bandage at this time. The ulcer has resolved.   No need for oral antibiotics.   He has been measured for his new Diabetic shoes and orthotics.   Follow-up in 4 weeks of wearing your new shoes to make sure there are no new pressure lesions.  Check your feet daily for signs of infection such as redness, openings, pain, drainage, swelling. Monitor for any new callus's to your feet. Wear shoes at all times. Keep your blood sugars under 120mg/dL.

## 2025-05-13 ENCOUNTER — OFFICE VISIT (OUTPATIENT)
Dept: NEUROLOGY | Facility: CLINIC | Age: 55
End: 2025-05-13
Attending: FAMILY MEDICINE
Payer: COMMERCIAL

## 2025-05-13 VITALS — HEART RATE: 94 BPM | SYSTOLIC BLOOD PRESSURE: 146 MMHG | DIASTOLIC BLOOD PRESSURE: 87 MMHG

## 2025-05-13 DIAGNOSIS — E11.29 TYPE 2 DIABETES MELLITUS WITH MICROALBUMINURIA, WITHOUT LONG-TERM CURRENT USE OF INSULIN (H): ICD-10-CM

## 2025-05-13 DIAGNOSIS — G57.93 NEUROPATHY OF BOTH FEET: ICD-10-CM

## 2025-05-13 DIAGNOSIS — R80.9 TYPE 2 DIABETES MELLITUS WITH MICROALBUMINURIA, WITHOUT LONG-TERM CURRENT USE OF INSULIN (H): ICD-10-CM

## 2025-05-13 PROCEDURE — 3077F SYST BP >= 140 MM HG: CPT | Performed by: PSYCHIATRY & NEUROLOGY

## 2025-05-13 PROCEDURE — 3079F DIAST BP 80-89 MM HG: CPT | Performed by: PSYCHIATRY & NEUROLOGY

## 2025-05-13 PROCEDURE — 99205 OFFICE O/P NEW HI 60 MIN: CPT | Performed by: PSYCHIATRY & NEUROLOGY

## 2025-05-13 RX ORDER — GABAPENTIN 300 MG/1
900 CAPSULE ORAL 3 TIMES DAILY
Qty: 270 CAPSULE | Refills: 5 | Status: SHIPPED | OUTPATIENT
Start: 2025-05-13

## 2025-05-13 RX ORDER — LORAZEPAM 1 MG/1
TABLET ORAL
Qty: 2 TABLET | Refills: 0 | Status: SHIPPED | OUTPATIENT
Start: 2025-05-13

## 2025-05-13 NOTE — LETTER
2025      Hermelindo Williamson  [address] [address] [address]      Dear Colleague,    Thank you for referring your patient, Hermelindo Williamson, to the [facility]UROLOG[facility]. Please see a copy of my visit note below.      Neurology Consultation    Patient Name:  Hermelindo Williamson  MRN:  [id number]    :  1970  Date of Service:  May 13, 2025  Primary care provider:  Freddy Johnson        Chief Complaint: Neuropathy     History of Present Illness:     Hermelindo Williamson is a 55 year old right handed male who presents for evaluation of neuropathy.     Started experiencing numbness in both feet about 2.5 years ago. Has no feeling in his feet from mid foot to the toes.  Has aching, burning, and sharp shooting pain in his feet.  He has not injured his foot and not known it but does report not feeling when his podiatrist was working on the ulcer on his toe.  He does endorse balance problems and sometimes uses a cane.  He will sometimes use a wheelchair.  He fell last week. Does not fall regularly but has several close calls. Has legs cramps.  Does endorse allodynia.  Does endorse low back pain that radiates circumferentially through both legs.  He feels like his legs are weak.      A few weeks ago he started experiencing rather suddenly that he can tell right hand weakness.  Was having difficulty grasping things.  It has improved a little bit since onset.  Denies associated pain or numbness/tingling. Now having pain in the back of his right arm which just started.  He does have neck pain. He has surgery on his neck several years ago which did help.  He is uncertain if this pain is radiating into his arms.  He does baseline has some light tingling in all fingers both hands.  Has aching in his forearms.  Gets random sharp shooting pain throughout body.      Was diagnosed with diabetes about 5 years ago. His diabetes has largely been poorly controlled since diagnosis except when he  "needed surgery several years ago. He is insulin dependent.      His mom had diabetes maybe had associated neuropathy. Otherwise he is not sure of a family history of neuropathy.  Denies toxic exposure to chemotherapy and radiation.      Does get lightheaded with standing.  He has frequent urination.     Treatment:  - Gabapentin 600 mg TID - helps about 50%, makes him a little bit sleepy   - Cyclobenzaprine 10 mg TID PRN   - TENs unit (temporary relief)  - He maybe tried a topical cream but does not remember the name. Did not help and \"just stunk\".      Review of Records   - Follows with podiatry for diabetic foot ulcer   - A1c 12%, B12 1392, TSH normal     Past Medical History:  - Diabetes mellitus type II with polyneuropathy, diabetic foot ulcer, hyperlipidemia, hypertension, anxiety, and depression   - Right arm surgery, neck surgery    Social History:  - Trying to get disability for diabetes.  . Smokes 1 pack every 3 days, denies alcohol consumption, denies recreational drug use        Physical Exam:  BP (!) 146/87 (BP Location: Right arm, Patient Position: Sitting, Cuff Size: Adult Regular)   Pulse 94     General:  No acute distress  Cardiovascular: Normal rate.  Lung: Respirations are non-labored.  Integumentary: Chronic skin changes in both feet, ulcer on left 2nd toe     Neurologic:  Mental status : alert, fund of knowledge largely appropriate for visit    LANGUAGE: Speech fluent, mild dysarthria due to edentulous      CN:  II- pupils equal and reactive, visual fields full  III, IV, VI- extraocular movements intact  V- sensation intact bilaterally  VII- face symmetric  VIII- hearing intact, no nystagmus  IX, X- palate elevates symmetrically  XI- sternocleidomastoid 5/5  XII- tongue midline    MOTOR : intact bulk, tone appropriate in bilateral upper extremities   Difficult strength exam, limited by pain and possibly effort. With repeated attempts and encouragement largely 5/5 in BUE except possibly " right hand weakness mainly finger abduction 4/5. BLE 4+/5 bilateral hip flexion and foot dorsiflexion and plantar flexion bilaterally     SENSORY : decrease to pp, temp, and vib distally in BUE. Significant sensory loss to temp, pp, and vib distally in BLE, temp and pp to about mid calf to below knee, absent vib sensation to lateral malleolus.  Romberg positive      REFLEXES:       Right Left   Triceps 1 1   Biceps 1 1   Brachioradialis 1 1   Knee jerk 0 0   Ankle jerk 0 0   Chavira absent   Toes mute bilaterally      MOVEMENT/COORDINATION: Some mild dysmetria to finger to nose bilaterally, no significant tremor.  Finger taps and rapid alternating movements mildly incoordinated.  Heel to shin with mild ataxia bilaterally.     GAIT : Very wide based unsteady gait, antalgic as well.     Cognition and Speech: Speech clear and coherent.    Psychiatric: Cooperative, Appropriate mood & affect.      Assessment/Plan:   Hermelindo Williamson is a 55 year old male who presents for evaluation of neuropathy.  Patient's presentation is consistent with severe and painful diabetic large and small fiber polyneuropathy.  His diabetes is very poorly controlled and has been largely poorly controlled for the last several years. I was hebert with the patient that he might have some improvement in his symptoms with better control of his diabetes but suspect his current deficits are unlikely to improve too much.  I also discussed though that control his sugars is crucial to prevent worsening of his neuropathy.  Will optimize his Gabapentin. Cautioned increased sedation with titration of Gabapentin.  Will check other treatable causes.  Will need a functional evaluation which I discussed we do not perform in neurology clinic.  Discussed PT referral for gait and balance; however, patient was not interested in this.  Consider EMG if worsening weakness out of proportion for his neuropathy.     He mentioned right hand weakness that started rather  suddenly.  He does have neuropathy in his hands which could be contributing but the rather sudden onset speaks against this. Will obtain MRI brain and MRA H/N for evaluation of vascular etiologies. Consider EMG and MRI C-spine if MRI brain unrevealing.     Severe large and small fiber diabetic polyneuropathy   Right hand weakness, sudden onset     Plan  > Increase Gabapentin 600 mg TID --> 900 mg TID increasing by 300 mg every week   > Check B1, SPEP with IFEX   > Consider EMG  > MRI brain, MRA H and N   > Functional capacity evaluation (will need to be completed outside [facility])  > Follow-up in 3 months     I spent 65 minutes providing care for this patient, including reviewing imaging, labs, and notes as well as providing counseling and coordination of care for the above issues.      Again, thank you for allowing me to participate in the care of your patient.        Sincerely,        Chelsea Bee, DO    Electronically signed no

## 2025-05-13 NOTE — NURSING NOTE
Chief Complaint   Patient presents with    Consult For     Neuropathy for both feet      Marilynn HERMOSILLO

## 2025-05-13 NOTE — PATIENT INSTRUCTIONS
- Increase Gabapentin 600 mg 3 times a day to 900 mg 3 times a day increasing 1 capsule every week:   First week: 2 capsules in the AM, 2 capsules in the afternoon, 3 capsules at bedtime   Second week: 2 capsules in the AM, 3 capsules in the afternoon, 3 capsules at bedtime   Third week and on: 2 capsules in the AM, 3 capsules in the afternoon, 3 capsules at bedtime

## 2025-05-13 NOTE — PROGRESS NOTES
Neurology Consultation    Patient Name:  Hermelindo Williamson  MRN:  [id number]    :  1970  Date of Service:  May 13, 2025  Primary care provider:  Freddy Johnosn        Chief Complaint: Neuropathy     History of Present Illness:     Hermelindo Williamson is a 55 year old right handed male who presents for evaluation of neuropathy.     Started experiencing numbness in both feet about 2.5 years ago. Has no feeling in his feet from mid foot to the toes.  Has aching, burning, and sharp shooting pain in his feet.  He has not injured his foot and not known it but does report not feeling when his podiatrist was working on the ulcer on his toe.  He does endorse balance problems and sometimes uses a cane.  He will sometimes use a wheelchair.  He fell last week. Does not fall regularly but has several close calls. Has legs cramps.  Does endorse allodynia.  Does endorse low back pain that radiates circumferentially through both legs.  He feels like his legs are weak.      A few weeks ago he started experiencing rather suddenly that he can tell right hand weakness.  Was having difficulty grasping things.  It has improved a little bit since onset.  Denies associated pain or numbness/tingling. Now having pain in the back of his right arm which just started.  He does have neck pain. He has surgery on his neck several years ago which did help.  He is uncertain if this pain is radiating into his arms.  He does baseline has some light tingling in all fingers both hands.  Has aching in his forearms.  Gets random sharp shooting pain throughout body.      Was diagnosed with diabetes about 5 years ago. His diabetes has largely been poorly controlled since diagnosis except when he needed surgery several years ago. He is insulin dependent.      His mom had diabetes maybe had associated neuropathy. Otherwise he is not sure of a family history of neuropathy.  Denies toxic exposure to chemotherapy and radiation.      Does get lightheaded with  "standing.  He has frequent urination.     Treatment:  - Gabapentin 600 mg TID - helps about 50%, makes him a little bit sleepy   - Cyclobenzaprine 10 mg TID PRN   - TENs unit (temporary relief)  - He maybe tried a topical cream but does not remember the name. Did not help and \"just stunk\".      Review of Records   - Follows with podiatry for diabetic foot ulcer   - A1c 12%, B12 1392, TSH normal     Past Medical History:  - Diabetes mellitus type II with polyneuropathy, diabetic foot ulcer, hyperlipidemia, hypertension, anxiety, and depression   - Right arm surgery, neck surgery    Social History:  - Trying to get disability for diabetes.  . Smokes 1 pack every 3 days, denies alcohol consumption, denies recreational drug use        Physical Exam:  BP (!) 146/87 (BP Location: Right arm, Patient Position: Sitting, Cuff Size: Adult Regular)   Pulse 94     General:  No acute distress  Cardiovascular: Normal rate.  Lung: Respirations are non-labored.  Integumentary: Chronic skin changes in both feet, ulcer on left 2nd toe     Neurologic:  Mental status : alert, fund of knowledge largely appropriate for visit    LANGUAGE: Speech fluent, mild dysarthria due to edentulous      CN:  II- pupils equal and reactive, visual fields full  III, IV, VI- extraocular movements intact  V- sensation intact bilaterally  VII- face symmetric  VIII- hearing intact, no nystagmus  IX, X- palate elevates symmetrically  XI- sternocleidomastoid 5/5  XII- tongue midline    MOTOR : intact bulk, tone appropriate in bilateral upper extremities   Difficult strength exam, limited by pain and possibly effort. With repeated attempts and encouragement largely 5/5 in BUE except possibly right hand weakness mainly finger abduction 4/5. BLE 4+/5 bilateral hip flexion and foot dorsiflexion and plantar flexion bilaterally     SENSORY : decrease to pp, temp, and vib distally in BUE. Significant sensory loss to temp, pp, and vib distally in BLE, temp and " pp to about mid calf to below knee, absent vib sensation to lateral malleolus.  Romberg positive      REFLEXES:       Right Left   Triceps 1 1   Biceps 1 1   Brachioradialis 1 1   Knee jerk 0 0   Ankle jerk 0 0   Chavira absent   Toes mute bilaterally      MOVEMENT/COORDINATION: Some mild dysmetria to finger to nose bilaterally, no significant tremor.  Finger taps and rapid alternating movements mildly incoordinated.  Heel to shin with mild ataxia bilaterally.     GAIT : Very wide based unsteady gait, antalgic as well.     Cognition and Speech: Speech clear and coherent.    Psychiatric: Cooperative, Appropriate mood & affect.      Assessment/Plan:   Hermelindo Williamson is a 55 year old male who presents for evaluation of neuropathy.  Patient's presentation is consistent with severe and painful diabetic large and small fiber polyneuropathy.  His diabetes is very poorly controlled and has been largely poorly controlled for the last several years. I was hebert with the patient that he might have some improvement in his symptoms with better control of his diabetes but suspect his current deficits are unlikely to improve too much.  I also discussed though that control his sugars is crucial to prevent worsening of his neuropathy.  Will optimize his Gabapentin. Cautioned increased sedation with titration of Gabapentin.  Will check other treatable causes.  Will need a functional evaluation which I discussed we do not perform in neurology clinic.  Discussed PT referral for gait and balance; however, patient was not interested in this.  Consider EMG if worsening weakness out of proportion for his neuropathy.     He mentioned right hand weakness that started rather suddenly.  He does have neuropathy in his hands which could be contributing but the rather sudden onset speaks against this. Will obtain MRI brain and MRA H/N for evaluation of vascular etiologies. Consider EMG and MRI C-spine if MRI brain unrevealing.     Severe large  and small fiber diabetic polyneuropathy   Right hand weakness, sudden onset     Plan  > Increase Gabapentin 600 mg TID --> 900 mg TID increasing by 300 mg every week   > Check B1, SPEP with IFEX   > Consider EMG  > MRI brain, MRA H and N   > Functional capacity evaluation (will need to be completed outside [facility])  > Follow-up in 3 months     I spent 65 minutes providing care for this patient, including reviewing imaging, labs, and notes as well as providing counseling and coordination of care for the above issues.

## 2025-06-23 ENCOUNTER — OFFICE VISIT (OUTPATIENT)
Dept: PODIATRY | Facility: CLINIC | Age: 55
End: 2025-06-23
Payer: COMMERCIAL

## 2025-06-23 ENCOUNTER — RESULTS FOLLOW-UP (OUTPATIENT)
Dept: PODIATRY | Facility: CLINIC | Age: 55
End: 2025-06-23

## 2025-06-23 ENCOUNTER — ANCILLARY PROCEDURE (OUTPATIENT)
Dept: GENERAL RADIOLOGY | Facility: CLINIC | Age: 55
End: 2025-06-23
Attending: PODIATRIST
Payer: COMMERCIAL

## 2025-06-23 DIAGNOSIS — L97.522 SKIN ULCER OF SECOND TOE OF LEFT FOOT WITH FAT LAYER EXPOSED (H): ICD-10-CM

## 2025-06-23 DIAGNOSIS — E11.43 TYPE II DIABETES MELLITUS WITH PERIPHERAL AUTONOMIC NEUROPATHY (H): ICD-10-CM

## 2025-06-23 DIAGNOSIS — E11.43 TYPE II DIABETES MELLITUS WITH PERIPHERAL AUTONOMIC NEUROPATHY (H): Primary | ICD-10-CM

## 2025-06-23 DIAGNOSIS — L84 PRE-ULCERATIVE CALLUSES: ICD-10-CM

## 2025-06-23 DIAGNOSIS — L03.032 CELLULITIS OF SECOND TOE, LEFT: ICD-10-CM

## 2025-06-23 PROCEDURE — 73660 X-RAY EXAM OF TOE(S): CPT | Mod: TC | Performed by: RADIOLOGY

## 2025-06-23 PROCEDURE — 99213 OFFICE O/P EST LOW 20 MIN: CPT | Performed by: PODIATRIST

## 2025-06-23 NOTE — LETTER
6/23/2025      Hermelindo Williamson  [address] [facility] [id number]      Dear Colleague,    Thank you for referring your patient, Hermelindo Williamson, to the [facility]. Please see a copy of my visit note below.    Subjective:  Hermelindo Williamson is a 55 year old male who is following up in clinic for chronic pain with Ulcer left 2nd toe. He had DM type II with painful peripheral neuropathy. He has had pain to his left 2nd toe for a couple of years with a progressing growth. He has his new Diabetic shoes and insoles today. He notes recurrence of the ulceration to his left 2nd toe.  He noted pus come from the tip of his toe yesterday which he cleaned with hydrogen peroxide, Betadine.  He has been wearing his new diabetic shoes and insoles for the last 4 weeks noting progression of the callus to the tip of his second toe followed by formation of the ulcer again.  He has noted his left leg to become much more swollen in the last couple of days compared to his right.  He notes no fevers, chills, nausea, vomiting.    EXAM:Vitals: There were no vitals taken for this visit.  BMI= There is no height or weight on file to calculate BMI.    A1C:   Lab Results   Component Value Date    A1C 12.0 04/14/2025    A1C 10.7 11/06/2023    A1C 11.7 07/31/2023     Lower Extremity Focused:    Dermatologic: Left 2nd toe distal dorsal slight hyperkeratotic tissue, with ulceration  erythema, no edema,  pain with palpation, no fluctuance, probes through the dermis to the subcutaneus layer, no probe to bone, no purulence, serosanguinous drainage. Distal tip of left 3rd toe hyperkeratotic lesion. Toenails x 10 thickened discolored, brittle, subungual debris.      Vascular: DP pulse 1/4 right 1/4 left PT pulse 2/4 right 2/4 left.  No significant edema or varicosities noted.  CFT and skin temperature is normal to both lower extremities.     Neurologic: Lower extremity sensation is diminished bilateral to light touch.       Musculoskeletal: toes 2-5 bilateral minimal to no plantarflexed at the IPJs, pain with palpation to the left 2nd toe distal phalanx     Radiographs:  I personally reviewed the xrays.    4/21/25 Left foot 3 views to rule out osteomyelitis 2nd toe noting per my read no osteomyelitis or lytic lesions to the distal phalanx 2nd toe.     New x-rays left foot 3 views toes ordered today and compared to  prior x-rays noting per my read no signs of osteomyelitis      ASSESSMENT:   DM type II with peripheral neuropathy uncontrolled HgA1c 12%  Neuropathic ulcer left 2nd toe resolving     Medical Decision Making/Plan:  Reviewed patient's chart in [facility].  Prescription for  Augmentin 875/125 mg 1 tab twice a day x 10 days due to cellulitis of the left foot second toe.    Continue with daily bandage changes using iodine and gauze.  He has been measured for his new Diabetic shoes and orthotics. Today in office assessment of shoes and insoles noting need for a metatarsal pad and/or offloading of the toes bilateral feet secondary to preulcerative calluses along with recurrence of ulceration to the left second toe today.   Discussed importance of blood sugar control he is noting his blood sugars to be in the 3-4 100s  Check your feet daily for signs of infection such as redness, openings, pain, drainage, swelling. Monitor for any new callus's to your feet. Wear shoes at all times. Keep your blood sugars under 120mg/dL.   X-rays left foot toes ordered and reviewed today to rule out osteomyelitis  Follow-up in 2 weeks    All questions were answered to patients satisfaction and they will call with further questions or concerns.       Carmella Sweet DPM      Again, thank you for allowing me to participate in the care of your patient.        Sincerely,        Carmella Sweet DPM    Electronically signed

## 2025-06-23 NOTE — PROGRESS NOTES
Subjective:  Hermelindo Williamson is a 55 year old male who is following up in clinic for chronic pain with Ulcer left 2nd toe. He had DM type II with painful peripheral neuropathy. He has had pain to his left 2nd toe for a couple of years with a progressing growth. He has his new Diabetic shoes and insoles today. He notes recurrence of the ulceration to his left 2nd toe.  He noted pus come from the tip of his toe yesterday which he cleaned with hydrogen peroxide, Betadine.  He has been wearing his new diabetic shoes and insoles for the last 4 weeks noting progression of the callus to the tip of his second toe followed by formation of the ulcer again.  He has noted his left leg to become much more swollen in the last couple of days compared to his right.  He notes no fevers, chills, nausea, vomiting.    EXAM:Vitals: There were no vitals taken for this visit.  BMI= There is no height or weight on file to calculate BMI.    A1C:   Lab Results   Component Value Date    A1C 12.0 04/14/2025    A1C 10.7 11/06/2023    A1C 11.7 07/31/2023     Lower Extremity Focused:    Dermatologic: Left 2nd toe distal dorsal slight hyperkeratotic tissue, with ulceration  erythema, no edema,  pain with palpation, no fluctuance, probes through the dermis to the subcutaneus layer, no probe to bone, no purulence, serosanguinous drainage. Distal tip of left 3rd toe hyperkeratotic lesion. Toenails x 10 thickened discolored, brittle, subungual debris.      Vascular: DP pulse 1/4 right 1/4 left PT pulse 2/4 right 2/4 left.  No significant edema or varicosities noted.  CFT and skin temperature is normal to both lower extremities.     Neurologic: Lower extremity sensation is diminished bilateral to light touch.      Musculoskeletal: toes 2-5 bilateral minimal to no plantarflexed at the IPJs, pain with palpation to the left 2nd toe distal phalanx     Radiographs:  I personally reviewed the xrays.    4/21/25 Left foot 3 views to rule out osteomyelitis 2nd  toe noting per my read no osteomyelitis or lytic lesions to the distal phalanx 2nd toe.     New x-rays left foot 3 views toes ordered today and compared to  prior x-rays noting per my read no signs of osteomyelitis      ASSESSMENT:   DM type II with peripheral neuropathy uncontrolled HgA1c 12%  Neuropathic ulcer left 2nd toe resolving     Medical Decision Making/Plan:  Reviewed patient's chart in [facility].  Prescription for  Augmentin 875/125 mg 1 tab twice a day x 10 days due to cellulitis of the left foot second toe.    Continue with daily bandage changes using iodine and gauze.  He has been measured for his new Diabetic shoes and orthotics. Today in office assessment of shoes and insoles noting need for a metatarsal pad and/or offloading of the toes bilateral feet secondary to preulcerative calluses along with recurrence of ulceration to the left second toe today.   Discussed importance of blood sugar control he is noting his blood sugars to be in the 3-4 100s  Check your feet daily for signs of infection such as redness, openings, pain, drainage, swelling. Monitor for any new callus's to your feet. Wear shoes at all times. Keep your blood sugars under 120mg/dL.   X-rays left foot toes ordered and reviewed today to rule out osteomyelitis  Follow-up in 2 weeks    All questions were answered to patients satisfaction and they will call with further questions or concerns.       Carmella Sweet DPM

## 2025-06-23 NOTE — PATIENT INSTRUCTIONS
Prescription for  Augmentin 875/125 mg 1 tab twice a day x 10 days due to cellulitis of the left foot second toe.    Continue with daily bandage changes using iodine and gauze.  He has been measured for his new Diabetic shoes and orthotics. Today in office assessment of shoes and insoles noting need for a metatarsal pad and/or offloading of the toes bilateral feet secondary to preulcerative calluses along with recurrence of ulceration to the left second toe today.   Discussed importance of blood sugar control he is noting his blood sugars to be in the 3-4 100s  Check your feet daily for signs of infection such as redness, openings, pain, drainage, swelling. Monitor for any new callus's to your feet. Wear shoes at all times. Keep your blood sugars under 120mg/dL.   X-rays left foot toes ordered and reviewed today to rule out osteomyelitis, noting no signs of periosteal reaction or bone infection per my read.   Follow-up in 2 weeks    You need adjustments to your orthotics at this time due to continued callus formation to the left second toe with progression, ulceration, infection noted today you will need consideration for a metatarsal pad with or without offloading of the left 2nd and 3rd toes distally.  Please consider a metatarsal pad for bilateral feet

## 2025-07-10 DIAGNOSIS — R80.9 TYPE 2 DIABETES MELLITUS WITH MICROALBUMINURIA, WITHOUT LONG-TERM CURRENT USE OF INSULIN (H): ICD-10-CM

## 2025-07-10 DIAGNOSIS — E11.29 TYPE 2 DIABETES MELLITUS WITH MICROALBUMINURIA, WITHOUT LONG-TERM CURRENT USE OF INSULIN (H): ICD-10-CM

## 2025-07-10 NOTE — TELEPHONE ENCOUNTER
Clinic RN: Please investigate patient's chart or contact patient if the information cannot be found because patient should have run out of this medication on May 2025. Confirm patient is taking this medication as prescribed. Document findings and route refill encounter to provider for approval or denial.    Claudine Villafuerte RN on 7/10/2025 at 10:59 AM

## 2025-07-10 NOTE — TELEPHONE ENCOUNTER
Attempt # 1    Called #   Telephone Information:   Mobile [phone]         Left a non detailed VM     Vicky Nance RN, BSN  [place] [facility]

## 2025-07-11 NOTE — TELEPHONE ENCOUNTER
Attempt # 2  Called Phone # [phone]      Left a non detailed voicemail to please call back and ask for any available triage nurse @ [phone] regarding a refill.   Sent mychart to patient.     Karey FRANCISCO RN   [facility] Triage

## 2025-07-15 NOTE — TELEPHONE ENCOUNTER
Attempt # 3    Called # [phone]     Left a non detailed VM to call back at [phone] and ask for any available Triage Nurse.    Kinsey Reyna RN on 7/15/2025 at 3:42 PM   [facility]

## 2025-07-16 RX ORDER — SEMAGLUTIDE 0.68 MG/ML
INJECTION, SOLUTION SUBCUTANEOUS
Qty: 3 ML | Refills: 0 | Status: SHIPPED | OUTPATIENT
Start: 2025-07-16 | End: 2025-07-17

## 2025-07-16 NOTE — TELEPHONE ENCOUNTER
Patient contacted 3 times already. Routing refill to primary care provider.    Thank you,  Emir, Triage RN Sandeep Lion    11:36 AM 7/16/2025

## 2025-07-17 DIAGNOSIS — E11.29 TYPE 2 DIABETES MELLITUS WITH MICROALBUMINURIA, WITHOUT LONG-TERM CURRENT USE OF INSULIN (H): ICD-10-CM

## 2025-07-17 DIAGNOSIS — R80.9 TYPE 2 DIABETES MELLITUS WITH MICROALBUMINURIA, WITHOUT LONG-TERM CURRENT USE OF INSULIN (H): ICD-10-CM

## 2025-07-17 RX ORDER — SEMAGLUTIDE 0.68 MG/ML
0.25 INJECTION, SOLUTION SUBCUTANEOUS
Qty: 3 ML | Refills: 0 | Status: SHIPPED | OUTPATIENT
Start: 2025-07-17

## 2025-07-26 ENCOUNTER — HEALTH MAINTENANCE LETTER (OUTPATIENT)
Age: 55
End: 2025-07-26